# Patient Record
Sex: FEMALE | Race: BLACK OR AFRICAN AMERICAN | Employment: OTHER | RURAL
[De-identification: names, ages, dates, MRNs, and addresses within clinical notes are randomized per-mention and may not be internally consistent; named-entity substitution may affect disease eponyms.]

---

## 2017-01-11 DIAGNOSIS — F32.A DEPRESSION, UNSPECIFIED DEPRESSION TYPE: ICD-10-CM

## 2017-01-11 RX ORDER — PAROXETINE HYDROCHLORIDE 20 MG/1
20 TABLET, FILM COATED ORAL DAILY
Qty: 30 TAB | Refills: 5 | Status: SHIPPED | OUTPATIENT
Start: 2017-01-11 | End: 2017-01-27 | Stop reason: SDUPTHER

## 2017-01-27 ENCOUNTER — OFFICE VISIT (OUTPATIENT)
Dept: FAMILY MEDICINE CLINIC | Age: 57
End: 2017-01-27

## 2017-01-27 VITALS
HEART RATE: 78 BPM | BODY MASS INDEX: 38.33 KG/M2 | RESPIRATION RATE: 18 BRPM | DIASTOLIC BLOOD PRESSURE: 51 MMHG | SYSTOLIC BLOOD PRESSURE: 115 MMHG | WEIGHT: 203 LBS | TEMPERATURE: 97.8 F | OXYGEN SATURATION: 98 % | HEIGHT: 61 IN

## 2017-01-27 DIAGNOSIS — Z83.3 FAMILY HISTORY OF DIABETES MELLITUS: ICD-10-CM

## 2017-01-27 DIAGNOSIS — Z12.11 COLON CANCER SCREENING: ICD-10-CM

## 2017-01-27 DIAGNOSIS — F32.A DEPRESSION, UNSPECIFIED DEPRESSION TYPE: ICD-10-CM

## 2017-01-27 DIAGNOSIS — B99.9 GRANULOMA DUE TO INFECTION: ICD-10-CM

## 2017-01-27 DIAGNOSIS — Z12.39 BREAST CANCER SCREENING: ICD-10-CM

## 2017-01-27 DIAGNOSIS — Z82.49 FAMILY HISTORY OF CORONARY ARTERY DISEASE: ICD-10-CM

## 2017-01-27 DIAGNOSIS — F41.1 GAD (GENERALIZED ANXIETY DISORDER): Primary | ICD-10-CM

## 2017-01-27 DIAGNOSIS — F43.10 PTSD (POST-TRAUMATIC STRESS DISORDER): ICD-10-CM

## 2017-01-27 DIAGNOSIS — B00.9 HERPES SIMPLEX INFECTION: ICD-10-CM

## 2017-01-27 RX ORDER — OMEPRAZOLE 40 MG/1
40 CAPSULE, DELAYED RELEASE ORAL DAILY
Qty: 30 CAP | Refills: 5 | Status: SHIPPED | OUTPATIENT
Start: 2017-01-27 | End: 2018-03-12 | Stop reason: SDUPTHER

## 2017-01-27 RX ORDER — PRAZOSIN HYDROCHLORIDE 1 MG/1
1 CAPSULE ORAL
Qty: 30 CAP | Refills: 5 | Status: SHIPPED | OUTPATIENT
Start: 2017-01-27 | End: 2018-03-12 | Stop reason: SDUPTHER

## 2017-01-27 RX ORDER — ACYCLOVIR 800 MG/1
TABLET ORAL
Qty: 15 TAB | Refills: 12 | Status: SHIPPED | OUTPATIENT
Start: 2017-01-27 | End: 2018-05-18 | Stop reason: ALTCHOICE

## 2017-01-27 RX ORDER — PAROXETINE HYDROCHLORIDE 20 MG/1
20 TABLET, FILM COATED ORAL DAILY
Qty: 30 TAB | Refills: 5 | Status: SHIPPED | OUTPATIENT
Start: 2017-01-27 | End: 2018-03-12 | Stop reason: SDUPTHER

## 2017-01-27 RX ORDER — DOXYCYCLINE 100 MG/1
100 TABLET ORAL 2 TIMES DAILY
Qty: 60 TAB | Refills: 5 | Status: SHIPPED | OUTPATIENT
Start: 2017-01-27 | End: 2018-05-18 | Stop reason: ALTCHOICE
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
## 2017-01-27 NOTE — MR AVS SNAPSHOT
Visit Information Date & Time Provider Department Dept. Phone Encounter #  
 1/27/2017  1:00 PM Carlos Cortes MD [facility] [phone] [id number] Upcoming Health Maintenance Date Due Hepatitis C Screening 1960 DTaP/Tdap/Td series (1 - Tdap) 9/7/1981 PAP AKA CERVICAL CYTOLOGY 9/7/1981 BREAST CANCER SCRN MAMMOGRAM 9/7/2010 FOBT Q 1 YEAR AGE 50-75 9/7/2010 INFLUENZA AGE 9 TO ADULT 8/1/2016 Allergies as of 1/27/2017  Review Complete On: 1/27/2017 By: Carlos Cortes MD  
  
 Severity Noted Reaction Type Reactions Prozac [Fluoxetine]  08/22/2013    Unknown (comments) Current Immunizations  Never Reviewed No immunizations on file. Not reviewed this visit You Were Diagnosed With   
  
 Codes Comments FELIPE (generalized anxiety disorder)    -  Primary ICD-10-CM: F41.1 ICD-9-CM: 300.02 Herpes simplex infection     ICD-10-CM: B00.9 ICD-9-CM: 054.9 PTSD (post-traumatic stress disorder)     ICD-10-CM: F43.10 ICD-9-CM: 309.81 Family history of coronary artery disease     ICD-10-CM: Z82.49 
ICD-9-CM: V17.3 Family history of diabetes mellitus     ICD-10-CM: Z83.3 ICD-9-CM: V18.0 Granuloma due to infection     ICD-10-CM: L98.0 ICD-9-CM: 686.1 Breast cancer screening     ICD-10-CM: Z12.39 
ICD-9-CM: V76.10 Colon cancer screening     ICD-10-CM: Z12.11 ICD-9-CM: V76.51 Vitals BP Pulse Temp Resp Height(growth percentile) Weight(growth percentile) 115/51 (BP 1 Location: Left arm, BP Patient Position: Sitting) 78 97.8 °F (36.6 °C) (Oral) 18 5' 1\" (1.549 m) 203 lb (92.1 kg) LMP SpO2 BMI OB Status Smoking Status 12/27/2016 (Within Days) 98% 38.36 kg/m2 Perimenopausal Never Smoker Vitals History BMI and BSA Data Body Mass Index Body Surface Area  
 38.36 kg/m 2 1.99 m 2 Preferred Pharmacy Pharmacy Name Phone [facility], [place] [address] [phone] Your Updated Medication List  
  
   
This list is accurate as of: 1/27/17  1:57 PM.  Always use your most recent med list.  
  
  
  
  
 amitriptyline 25 mg tablet Commonly known as:  ELAVIL Take  by mouth nightly. azithromycin 250 mg tablet Commonly known as:  Nyla Pickup Take 2 tablets today, then take 1 tablet daily  
  
 fluticasone 50 mcg/actuation nasal spray Commonly known as:  FLONASE  
1 spray in each nostril twice a day PARoxetine 20 mg tablet Commonly known as:  PAXIL Take 1 Tab by mouth daily. Up to 3 a week for FELIPE  
  
 predniSONE 20 mg tablet Commonly known as:  DELTASONE  
5 po every day x 4 days then 4 on day 5, 3 on day 6, 2 on day 7, and 1 on day 8 PRILOSEC PO Take  by mouth.  
  
 pseudoephedrine  mg CR tablet Commonly known as:  SUDAFED 12 HOUR Take 1 Tab by mouth two (2) times daily as needed for Congestion. Introducing [facility]! Angelic Ribeiro introduces [email or web address] patient portal. Now you can access parts of your medical record, email your doctor's office, and request medication refills online. 1. In your internet browser, go to [email or web address] 2. Click on the First Time User? Click Here link in the Sign In box. You will see the New Member Sign Up page. 3. Enter your Neuroware[employer][email or web address] Access Code exactly as it appears below. You will not need to use this code after youve completed the sign-up process. If you do not sign up before the expiration date, you must request a new code. · [email or web address] Access Code: [email or web address] Expires: 3/15/2017  3:54 PM 
 
4. Enter the last four digits of your Social Security Number (xxxx) and Date of Birth (mm/dd/yyyy) as indicated and click Submit. You will be taken to the next sign-up page. 5. Create a [email or web address] ID.  This will be your Neuroware.io login ID and cannot be changed, so think of one that is secure and easy to remember. 6. Create a Diwanee password. You can change your password at any time. 7. Enter your Password Reset Question and Answer. This can be used at a later time if you forget your password. 8. Enter your e-mail address. You will receive e-mail notification when new information is available in [address] 9. Click Sign Up. You can now view and download portions of your medical record. 10. Click the Download Summary menu link to download a portable copy of your medical information. If you have questions, please visit the Frequently Asked Questions section of the Diwanee website. Remember, Diwanee is NOT to be used for urgent needs. For medical emergencies, dial 911. Now available from your iPhone and Android! Please provide this summary of care documentation to your next provider. Your primary care clinician is listed as Selene Garcia. If you have any questions after today's visit, please call [phone].

## 2017-01-29 LAB
CHOLEST SERPL-MCNC: 207 MG/DL (ref 100–199)
EST. AVERAGE GLUCOSE BLD GHB EST-MCNC: 134 MG/DL
HBA1C MFR BLD: 6.3 % (ref 4.8–5.6)
HDLC SERPL-MCNC: 73 MG/DL
HSV1 IGG SER IA-ACNC: >62.2 INDEX (ref 0–0.9)
HSV2 IGG SER IA-ACNC: 16.5 INDEX (ref 0–0.9)
INTERPRETATION, 910389: NORMAL
LDLC SERPL CALC-MCNC: 118 MG/DL (ref 0–99)
TRIGL SERPL-MCNC: 78 MG/DL (ref 0–149)
VLDLC SERPL CALC-MCNC: 16 MG/DL (ref 5–40)

## 2017-03-21 ENCOUNTER — OFFICE VISIT (OUTPATIENT)
Dept: FAMILY MEDICINE CLINIC | Age: 57
End: 2017-03-21

## 2017-03-21 VITALS
DIASTOLIC BLOOD PRESSURE: 53 MMHG | OXYGEN SATURATION: 100 % | HEIGHT: 61 IN | TEMPERATURE: 97.5 F | WEIGHT: 199 LBS | BODY MASS INDEX: 37.57 KG/M2 | RESPIRATION RATE: 18 BRPM | HEART RATE: 83 BPM | SYSTOLIC BLOOD PRESSURE: 126 MMHG

## 2017-03-21 DIAGNOSIS — J01.00 ACUTE NON-RECURRENT MAXILLARY SINUSITIS: ICD-10-CM

## 2017-03-21 DIAGNOSIS — M65.312 TRIGGER FINGER OF LEFT THUMB: Primary | ICD-10-CM

## 2017-03-21 DIAGNOSIS — R05.9 COUGH: ICD-10-CM

## 2017-03-21 RX ORDER — IPRATROPIUM BROMIDE 42 UG/1
2 SPRAY, METERED NASAL 4 TIMES DAILY
Qty: 15 ML | Refills: 0 | Status: SHIPPED | OUTPATIENT
Start: 2017-03-21 | End: 2018-05-18 | Stop reason: ALTCHOICE

## 2017-03-21 RX ORDER — PREDNISONE 20 MG/1
TABLET ORAL
Qty: 30 TAB | Refills: 0 | Status: SHIPPED | OUTPATIENT
Start: 2017-03-21 | End: 2018-05-18 | Stop reason: ALTCHOICE

## 2017-03-21 RX ORDER — CLINDAMYCIN HYDROCHLORIDE 150 MG/1
150 CAPSULE ORAL 3 TIMES DAILY
Qty: 30 CAP | Refills: 0 | Status: SHIPPED | OUTPATIENT
Start: 2017-03-21 | End: 2017-03-31

## 2017-03-21 NOTE — PROGRESS NOTES
Ann-Marie Michaels is a 64 y.o. female who presents with the following:  Chief Complaint   Patient presents with    Sinus Infection    Finger Pain     thumb, left       Sinus Infection    The history is provided by the patient (Patient with 2 months of sinus congestion and postnasal drip with chronic cough). Associated symptoms include congestion, sinus pressure, cough and rhinorrhea. Pertinent negatives include no chills, no sweats, no ear pain, no sore throat, no swollen glands, no shortness of breath, no neck pain, no neck pain, no headaches and no chest pain. Finger Pain   The history is provided by the patient (Patient with painful left trigger thumb and has pain at the DIP joint on the palmar side as well as the MPJ on the palmar side which is the most tender). Pertinent negatives include no chest pain, no abdominal pain, no headaches and no shortness of breath. Allergies   Allergen Reactions    Prozac [Fluoxetine] Unknown (comments)       Current Outpatient Prescriptions   Medication Sig    predniSONE (DELTASONE) 20 mg tablet 5 po every day x 4 days then 4 on day 5, 3 on day 6, 2 on day 7, and 1 on day 8    clindamycin (CLEOCIN) 150 mg capsule Take 1 Cap by mouth three (3) times daily for 10 days.  ipratropium (ATROVENT) 0.06 % nasal spray 2 Sprays by Both Nostrils route four (4) times daily.  prazosin (MINIPRESS) 1 mg capsule Take 1 Cap by mouth nightly.  doxycycline (ADOXA) 100 mg tablet Take 1 Tab by mouth two (2) times a day.  acyclovir (ZOVIRAX) 800 mg tablet 1 tablet p.o. 3 times a day for 5 days    PARoxetine (PAXIL) 20 mg tablet Take 1 Tab by mouth daily. Up to 3 a week for FELIPE    omeprazole (PRILOSEC) 40 mg capsule Take 1 Cap by mouth daily. No current facility-administered medications for this visit.         Past Medical History:   Diagnosis Date    Anxiety     Depression     Esophageal reflux     Insomnia     [facility] spotted fever) 03/2012       No past surgical history on file. Family History   Problem Relation Age of Onset    Diabetes Sister        Social History     Social History    Marital status:      Spouse name: N/A    Number of children: N/A    Years of education: N/A     Social History Main Topics    Smoking status: Never Smoker    Smokeless tobacco: Never Used    Alcohol use No    Drug use: No    Sexual activity: Not Asked     Other Topics Concern    None     Social History Narrative       Review of Systems   Constitutional: Negative for chills. HENT: Positive for congestion, rhinorrhea and sinus pressure. Negative for ear pain and sore throat. Respiratory: Positive for cough. Negative for shortness of breath. Cardiovascular: Negative for chest pain. Gastrointestinal: Negative for abdominal pain. Musculoskeletal: Negative for neck pain. Neurological: Negative for headaches. Visit Vitals    /53 (BP 1 Location: Left arm, BP Patient Position: Sitting)    Pulse 83    Temp 97.5 °F (36.4 °C) (Oral)    Resp 18    Ht 5' 1\" (1.549 m)    Wt 199 lb (90.3 kg)    SpO2 100%    BMI 37.6 kg/m2     Physical Exam   Constitutional: She is oriented to person, place, and time. No distress. Ill looking   HENT:   Head: Normocephalic and atraumatic. Right Ear: External ear normal.   Left Ear: External ear normal.   MM's are red with pus about them-post nasal drip with pus down the throat   Eyes: Conjunctivae and EOM are normal. Pupils are equal, round, and reactive to light. Right eye exhibits no discharge. Left eye exhibits no discharge. Neck: Normal range of motion. Neck supple. No tracheal deviation present. No thyromegaly present. Cardiovascular: Normal rate, regular rhythm, normal heart sounds and intact distal pulses. Exam reveals no gallop and no friction rub. No murmur heard. Pulmonary/Chest: Effort normal and breath sounds normal. No respiratory distress. She has no wheezes. She has no rales.  She exhibits no tenderness. Abdominal: Soft. Bowel sounds are normal. She exhibits no distension and no mass. There is no tenderness. Musculoskeletal: Normal range of motion. She exhibits tenderness (Tenderness at the MPJ on the palmar side of the thumb and at the DIP on the palmar side of the thumb demonstrating both a trigger effect). She exhibits no edema. Lymphadenopathy:     She has cervical adenopathy. Neurological: She is alert and oriented to person, place, and time. She has normal reflexes. No cranial nerve deficit. Gait normal. Coordination normal.   Skin: No rash noted. She is not diaphoretic. No erythema. Psychiatric: Mood, memory, affect and judgment normal.         ICD-10-CM ICD-9-CM    1. Trigger finger of left thumb M65.312 727.03 predniSONE (DELTASONE) 20 mg tablet   2. Acute non-recurrent maxillary sinusitis J01.00 461.0 predniSONE (DELTASONE) 20 mg tablet      clindamycin (CLEOCIN) 150 mg capsule   3. Cough R05 786.2 ipratropium (ATROVENT) 0.06 % nasal spray    the patient should return in 1 week if the trigger thumb has not resolved with ice applications 10 minutes 4 times a day and the prednisone she is also getting for her sinus infection then would inject these 2 areas    Orders Placed This Encounter    predniSONE (DELTASONE) 20 mg tablet     Si po every day x 4 days then 4 on day 5, 3 on day 6, 2 on day 7, and 1 on day 8     Dispense:  30 Tab     Refill:  0    clindamycin (CLEOCIN) 150 mg capsule     Sig: Take 1 Cap by mouth three (3) times daily for 10 days. Dispense:  30 Cap     Refill:  0    ipratropium (ATROVENT) 0.06 % nasal spray     Si Sprays by Both Nostrils route four (4) times daily.      Dispense:  15 mL     Refill:  0       Follow-up Disposition: Not on Wally Kaye MD

## 2017-03-21 NOTE — MR AVS SNAPSHOT
Visit Information Date & Time Provider Department Dept. Phone Encounter #  
 3/21/2017  1:20 PM Elvia Noel MD [facility] [phone] [id number] Your Appointments 7/27/2017  1:20 PM  
Follow Up with Elvia Noel MD  
[facility] [address]) Appt Note: 6 month f/u  
 [address] [facility] [phone]  
  
   
 [address] [facility] [phone] Upcoming Health Maintenance Date Due Hepatitis C Screening 1960 DTaP/Tdap/Td series (1 - Tdap) 9/7/1981 PAP AKA CERVICAL CYTOLOGY 9/7/1981 BREAST CANCER SCRN MAMMOGRAM 9/7/2010 FOBT Q 1 YEAR AGE 50-75 9/7/2010 INFLUENZA AGE 9 TO ADULT 8/1/2016 Allergies as of 3/21/2017  Review Complete On: 3/21/2017 By: Elvia Noel MD  
  
 Severity Noted Reaction Type Reactions Prozac [Fluoxetine]  08/22/2013    Unknown (comments) Current Immunizations  Never Reviewed No immunizations on file. Not reviewed this visit You Were Diagnosed With   
  
 Codes Comments Trigger finger of left thumb    -  Primary ICD-10-CM: R90.699 ICD-9-CM: 727.03 Acute non-recurrent maxillary sinusitis     ICD-10-CM: J01.00 ICD-9-CM: 461.0 Cough     ICD-10-CM: R05 ICD-9-CM: 441. 2 Vitals BP Pulse Temp Resp Height(growth percentile) Weight(growth percentile) 126/53 (BP 1 Location: Left arm, BP Patient Position: Sitting) 83 97.5 °F (36.4 °C) (Oral) 18 5' 1\" (1.549 m) 199 lb (90.3 kg) SpO2 BMI OB Status Smoking Status 100% 37.6 kg/m2 Perimenopausal Never Smoker Vitals History BMI and BSA Data Body Mass Index Body Surface Area  
 37.6 kg/m 2 1.97 m 2 Preferred Pharmacy Pharmacy Name Phone [facility] [place] [address], [place] - [address] [phone] Your Updated Medication List  
  
   
 This list is accurate as of: 3/21/17  2:03 PM.  Always use your most recent med list.  
  
  
  
  
 acyclovir 800 mg tablet Commonly known as:  ZOVIRAX 1 tablet p.o. 3 times a day for 5 days  
  
 clindamycin 150 mg capsule Commonly known as:  CLEOCIN Take 1 Cap by mouth three (3) times daily for 10 days. doxycycline 100 mg tablet Commonly known as:  ADOXA Take 1 Tab by mouth two (2) times a day. ipratropium 0.06 % nasal spray Commonly known as:  ATROVENT  
2 Sprays by Both Nostrils route four (4) times daily. omeprazole 40 mg capsule Commonly known as:  PriLOSEC Take 1 Cap by mouth daily. PARoxetine 20 mg tablet Commonly known as:  PAXIL Take 1 Tab by mouth daily. Up to 3 a week for FELIPE  
  
 prazosin 1 mg capsule Commonly known as:  MINIPRESS Take 1 Cap by mouth nightly. predniSONE 20 mg tablet Commonly known as:  DELTASONE  
5 po every day x 4 days then 4 on day 5, 3 on day 6, 2 on day 7, and 1 on day 8 Prescriptions Sent to Pharmacy Refills  
 predniSONE (DELTASONE) 20 mg tablet 0 Si po every day x 4 days then 4 on day 5, 3 on day 6, 2 on day 7, and 1 on day 8 Class: Normal  
 Pharmacy: [facility] [address] [place] [address] - [address] Ph #: [phone]  
 clindamycin (CLEOCIN) 150 mg capsule 0 Sig: Take 1 Cap by mouth three (3) times daily for 10 days. Class: Normal  
 Pharmacy: [facility] [address] [place] [address] [address] Ph #: [phone] Route: Oral  
 ipratropium (ATROVENT) 0.06 % nasal spray 0 Si Sprays by Both Nostrils route four (4) times daily. Class: Normal  
 Pharmacy: [facility] [address] [place] [address] [address] [address] [address] Ph #: [phone] Route: Both Nostrils Introducing [facility]!    
 [facility] introduces [employer] patient portal. Now you can access parts of your medical record, email your doctor's office, and request medication refills online. 1. In your internet browser, go to [email or web address] 2. Click on the First Time User? Click Here link in the Sign In box. You will see the New Member Sign Up page. 3. Enter your Agent Ace Access Code exactly as it appears below. You will not need to use this code after youve completed the sign-up process. If you do not sign up before the expiration date, you must request a new code. · [employer] Ace Access Code: [email or web address] Expires: 6/19/2017  2:00 PM 
 
4. Enter the last four digits of your Social Security Number (xxxx) and Date of Birth (mm/dd/yyyy) as indicated and click Submit. You will be taken to the next sign-up page. 5. Create a Agent Ace ID. This will be your Agent Ace login ID and cannot be changed, so think of one that is secure and easy to remember. 6. Create a Agent Ace password. You can change your password at any time. 7. Enter your Password Reset Question and Answer. This can be used at a later time if you forget your password. 8. Enter your e-mail address. You will receive e-mail notification when new information is available in [address] 9. Click Sign Up. You can now view and download portions of your medical record. 10. Click the Download Summary menu link to download a portable copy of your medical information. If you have questions, please visit the Frequently Asked Questions section of the Agent Ace website. Remember, Agent Ace is NOT to be used for urgent needs. For medical emergencies, dial 911. Now available from your iPhone and Android! Please provide this summary of care documentation to your next provider. Your primary care clinician is listed as Brooklyn Garcia. If you have any questions after today's visit, please call [phone].

## 2018-03-12 ENCOUNTER — OFFICE VISIT (OUTPATIENT)
Dept: FAMILY MEDICINE CLINIC | Age: 58
End: 2018-03-12

## 2018-03-12 VITALS
HEIGHT: 61 IN | RESPIRATION RATE: 18 BRPM | BODY MASS INDEX: 39.65 KG/M2 | HEART RATE: 91 BPM | SYSTOLIC BLOOD PRESSURE: 128 MMHG | TEMPERATURE: 97.9 F | OXYGEN SATURATION: 97 % | WEIGHT: 210 LBS | DIASTOLIC BLOOD PRESSURE: 82 MMHG

## 2018-03-12 DIAGNOSIS — F32.A DEPRESSION, UNSPECIFIED DEPRESSION TYPE: ICD-10-CM

## 2018-03-12 DIAGNOSIS — F43.10 PTSD (POST-TRAUMATIC STRESS DISORDER): ICD-10-CM

## 2018-03-12 DIAGNOSIS — Z13.220 LIPID SCREENING: ICD-10-CM

## 2018-03-12 DIAGNOSIS — F41.1 GAD (GENERALIZED ANXIETY DISORDER): Primary | ICD-10-CM

## 2018-03-12 DIAGNOSIS — Z12.39 BREAST CANCER SCREENING: ICD-10-CM

## 2018-03-12 DIAGNOSIS — Z11.59 ENCOUNTER FOR HEPATITIS C SCREENING TEST FOR LOW RISK PATIENT: ICD-10-CM

## 2018-03-12 DIAGNOSIS — K21.9 GASTROESOPHAGEAL REFLUX DISEASE WITHOUT ESOPHAGITIS: ICD-10-CM

## 2018-03-12 RX ORDER — PAROXETINE HYDROCHLORIDE 20 MG/1
20 TABLET, FILM COATED ORAL DAILY
Qty: 30 TAB | Refills: 5 | Status: SHIPPED | OUTPATIENT
Start: 2018-03-12 | End: 2019-04-24 | Stop reason: SDUPTHER

## 2018-03-12 RX ORDER — OMEPRAZOLE 40 MG/1
40 CAPSULE, DELAYED RELEASE ORAL DAILY
Qty: 30 CAP | Refills: 5 | Status: SHIPPED | OUTPATIENT
Start: 2018-03-12 | End: 2018-05-18

## 2018-03-12 RX ORDER — PRAZOSIN HYDROCHLORIDE 1 MG/1
CAPSULE ORAL
Qty: 150 CAP | Refills: 5 | Status: SHIPPED | OUTPATIENT
Start: 2018-03-12 | End: 2018-05-18

## 2018-03-12 NOTE — MR AVS SNAPSHOT
Daisyamanuel De La Vega 
 
 
 [address] [facility] [phone] Patient: Jonnie Collet MRN: [id number]:[id number] Visit Information Date & Time Provider Department Dept. Phone Encounter #  
 3/12/2018  9:00 AM Gillian Serrano  [address] [facility] [phone] [id number] Upcoming Health Maintenance Date Due  
 PAP AKA CERVICAL CYTOLOGY 9/7/1981 BREAST CANCER SCRN MAMMOGRAM 9/7/2010 FOBT Q 1 YEAR AGE 50-75 9/7/2010 DTaP/Tdap/Td series (2 - Td) 3/12/2028 Allergies as of 3/12/2018  Review Complete On: 3/12/2018 By: Gillian Serrano MD  
  
 Severity Noted Reaction Type Reactions Prozac [Fluoxetine]  08/22/2013    Unknown (comments) Current Immunizations  Never Reviewed No immunizations on file. Not reviewed this visit You Were Diagnosed With   
  
 Codes Comments FELIPE (generalized anxiety disorder)    -  Primary ICD-10-CM: F41.1 ICD-9-CM: 300.02 Depression, unspecified depression type     ICD-10-CM: F32.9 ICD-9-CM: 422 PTSD (post-traumatic stress disorder)     ICD-10-CM: F43.10 ICD-9-CM: 309.81 Gastroesophageal reflux disease without esophagitis     ICD-10-CM: K21.9 ICD-9-CM: 530.81 Encounter for hepatitis C screening test for low risk patient     ICD-10-CM: Z11.59 
ICD-9-CM: V73.89 Lipid screening     ICD-10-CM: R84.433 ICD-9-CM: V77.91 Breast cancer screening     ICD-10-CM: Z12.31 
ICD-9-CM: V76.10 Vitals BP Pulse Temp Resp Height(growth percentile) Weight(growth percentile) 128/82 (BP 1 Location: Left arm, BP Patient Position: Sitting) 91 97.9 °F (36.6 °C) (Oral) 18 5' 1\" (1.549 m) 210 lb (95.3 kg) SpO2 BMI OB Status Smoking Status 97% 39.68 kg/m2 Perimenopausal Never Smoker Vitals History BMI and BSA Data Body Mass Index Body Surface Area  
 39.68 kg/m 2 2.03 m 2 Preferred Pharmacy Pharmacy Name Phone [facility] [phone] [phone] Your Updated Medication List  
  
   
This list is accurate as of 3/12/18  9:38 AM.  Always use your most recent med list.  
  
  
  
  
 acyclovir 800 mg tablet Commonly known as:  ZOVIRAX 1 tablet p.o. 3 times a day for 5 days  
  
 doxycycline 100 mg tablet Commonly known as:  ADOXA Take 1 Tab by mouth two (2) times a day. ipratropium 42 mcg (0.06 %) nasal spray Commonly known as:  ATROVENT  
2 Sprays by Both Nostrils route four (4) times daily. omeprazole 40 mg capsule Commonly known as:  PriLOSEC Take 1 Cap by mouth daily. PARoxetine 20 mg tablet Commonly known as:  PAXIL Take 1 Tab by mouth daily. Up to 3 a week for FELIPE  
  
 prazosin 1 mg capsule Commonly known as:  MINIPRESS Take 1 Cap by mouth nightly. predniSONE 20 mg tablet Commonly known as:  DELTASONE  
5 po every day x 4 days then 4 on day 5, 3 on day 6, 2 on day 7, and 1 on day 8 Introducing [facility] SERVICES! Clark Callahan introduces [employer] patient portal. Now you can access parts of your medical record, email your doctor's office, and request medication refills online. 1. In your internet browser, go to [email or web address] 2. Click on the First Time User? Click Here link in the Sign In box. You will see the New Member Sign Up page. 3. Enter your [employer] Access Code exactly as it appears below. You will not need to use this code after youve completed the sign-up process. If you do not sign up before the expiration date, you must request a new code. · [employer] Access Code: [email or web address] Expires: 6/10/2018  8:57 AM 
 
4. Enter the last four digits of your Social Security Number (xxxx) and Date of Birth (mm/dd/yyyy) as indicated and click Submit. You will be taken to the next sign-up page. 5. Create a [employer] ID. This will be your [employer] login ID and cannot be changed, so think of one that is secure and easy to remember. 6. Create a [employer] password. You can change your password at any time. 7. Enter your Password Reset Question and Answer. This can be used at a later time if you forget your password. 8. Enter your e-mail address. You will receive e-mail notification when new information is available in [address] 9. Click Sign Up. You can now view and download portions of your medical record. 10. Click the Download Summary menu link to download a portable copy of your medical information. If you have questions, please visit the Frequently Asked Questions section of the [employer] website. Remember, [employer] is NOT to be used for urgent needs. For medical emergencies, dial 911. Now available from your iPhone and Android! Please provide this summary of care documentation to your next provider. Your primary care clinician is listed as Hakeem Berry. If you have any questions after today's visit, please call [phone].
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
## 2018-03-13 LAB
ALBUMIN SERPL-MCNC: 4.3 G/DL (ref 3.5–5.5)
ALBUMIN/GLOB SERPL: 1.4 {RATIO} (ref 1.2–2.2)
ALP SERPL-CCNC: 126 IU/L (ref 39–117)
ALT SERPL-CCNC: 15 IU/L (ref 0–32)
AST SERPL-CCNC: 17 IU/L (ref 0–40)
BILIRUB SERPL-MCNC: 0.3 MG/DL (ref 0–1.2)
BUN SERPL-MCNC: 10 MG/DL (ref 6–24)
BUN/CREAT SERPL: 14 (ref 9–23)
CALCIUM SERPL-MCNC: 9.4 MG/DL (ref 8.7–10.2)
CHLORIDE SERPL-SCNC: 99 MMOL/L (ref 96–106)
CHOLEST SERPL-MCNC: 224 MG/DL (ref 100–199)
CO2 SERPL-SCNC: 27 MMOL/L (ref 18–29)
CREAT SERPL-MCNC: 0.73 MG/DL (ref 0.57–1)
GFR SERPLBLD CREATININE-BSD FMLA CKD-EPI: 106 ML/MIN/1.73
GFR SERPLBLD CREATININE-BSD FMLA CKD-EPI: 92 ML/MIN/1.73
GLOBULIN SER CALC-MCNC: 3.1 G/DL (ref 1.5–4.5)
GLUCOSE SERPL-MCNC: 113 MG/DL (ref 65–99)
HCV AB S/CO SERPL IA: <0.1 S/CO RATIO (ref 0–0.9)
HDLC SERPL-MCNC: 60 MG/DL
INTERPRETATION, 910389: NORMAL
LDLC SERPL CALC-MCNC: 140 MG/DL (ref 0–99)
POTASSIUM SERPL-SCNC: 4.5 MMOL/L (ref 3.5–5.2)
PROT SERPL-MCNC: 7.4 G/DL (ref 6–8.5)
SODIUM SERPL-SCNC: 141 MMOL/L (ref 134–144)
TRIGL SERPL-MCNC: 121 MG/DL (ref 0–149)
VLDLC SERPL CALC-MCNC: 24 MG/DL (ref 5–40)

## 2018-05-18 ENCOUNTER — OFFICE VISIT (OUTPATIENT)
Dept: FAMILY MEDICINE CLINIC | Age: 58
End: 2018-05-18

## 2018-05-18 VITALS
DIASTOLIC BLOOD PRESSURE: 54 MMHG | TEMPERATURE: 97.5 F | BODY MASS INDEX: 39.65 KG/M2 | HEIGHT: 61 IN | WEIGHT: 210 LBS | RESPIRATION RATE: 18 BRPM | OXYGEN SATURATION: 98 % | HEART RATE: 94 BPM | SYSTOLIC BLOOD PRESSURE: 136 MMHG

## 2018-05-18 DIAGNOSIS — R09.82 POST-NASAL DRIP: Primary | ICD-10-CM

## 2018-05-18 DIAGNOSIS — H61.23 BILATERAL IMPACTED CERUMEN: ICD-10-CM

## 2018-05-18 DIAGNOSIS — J30.2 SEASONAL ALLERGIC RHINITIS, UNSPECIFIED TRIGGER: ICD-10-CM

## 2018-05-18 RX ORDER — FLUTICASONE PROPIONATE 50 MCG
2 SPRAY, SUSPENSION (ML) NASAL DAILY
Qty: 1 BOTTLE | Refills: 0 | Status: SHIPPED | OUTPATIENT
Start: 2018-05-18 | End: 2019-06-18

## 2018-05-18 RX ORDER — CETIRIZINE HCL 10 MG
10 TABLET ORAL DAILY
Qty: 30 TAB | Refills: 3 | Status: SHIPPED | OUTPATIENT
Start: 2018-05-18 | End: 2018-08-16 | Stop reason: ALTCHOICE

## 2018-05-18 NOTE — PROGRESS NOTES
Subjective:     Chief Complaint   Patient presents with    Sinus Infection       Ashley Velázquez is a 62 y.o. female who presents today with c/o a persistent cough for over 4 weeks. Cough produces mucus that is usually clear, sometimes yellow and is worse at night. Associated with a runny nose. Has been taking OTC sinus medication without relief. No hx of asthma or other respiratory conditions. Patient Active Problem List   Diagnosis Code    FELIPE (generalized anxiety disorder) F41.1    Trigger finger of left thumb M65.312       Past Medical History:   Diagnosis Date    Anxiety     Depression     Esophageal reflux     Insomnia     [facility]         Current Outpatient Prescriptions:     PARoxetine (PAXIL) 20 mg tablet, Take 1 Tab by mouth daily. Up to 3 a week for FELIPE, Disp: 30 Tab, Rfl: 5    omeprazole (PRILOSEC) 40 mg capsule, Take 1 Cap by mouth daily. , Disp: 30 Cap, Rfl: 5    prazosin (MINIPRESS) 1 mg capsule, Take 1 to 5 caps nightly, Disp: 150 Cap, Rfl: 5    predniSONE (DELTASONE) 20 mg tablet, 5 po every day x 4 days then 4 on day 5, 3 on day 6, 2 on day 7, and 1 on day 8, Disp: 30 Tab, Rfl: 0    ipratropium (ATROVENT) 0.06 % nasal spray, 2 Sprays by Both Nostrils route four (4) times daily. , Disp: 15 mL, Rfl: 0    doxycycline (ADOXA) 100 mg tablet, Take 1 Tab by mouth two (2) times a day., Disp: 60 Tab, Rfl: 5    acyclovir (ZOVIRAX) 800 mg tablet, 1 tablet p.o. 3 times a day for 5 days, Disp: 15 Tab, Rfl: 12    Allergies   Allergen Reactions    Prozac [Fluoxetine] Unknown (comments)       No past surgical history on file.     History   Smoking Status    Never Smoker   Smokeless Tobacco    Never Used       Social History     Social History    Marital status:      Spouse name: N/A    Number of children: N/A    Years of education: N/A     Social History Main Topics    Smoking status: Never Smoker    Smokeless tobacco: Never Used    Alcohol use No  Drug use: No    Sexual activity: Not on file     Other Topics Concern    Not on file     Social History Narrative       Family History   Problem Relation Age of Onset    Diabetes Sister        ROS:  Gen: denies fever, chills, or fatigue  HEENT:+ rhinorrhea. denies H/A, sinus tenderness, nasal congestion, ear pain, or sore throat  Resp: + cough. denies dyspnea or wheezing  CV: denies chest pain, pressure, or palpitations  Extremeties: denies edema, pallor, or cyanosis  Musculoskeletal: no myalgia  Skin: denies rashes or new lesions   Heme: no lymphadenopathy     Objective:     Visit Vitals    Resp 18    Ht 5' 1\" (1.549 m)    Wt 210 lb (95.3 kg)    BMI 39.68 kg/m2     Body mass index is 39.68 kg/(m^2). General: Alert and oriented. No acute distress. Well nourished. HEENT : No sinus tenderness  Ears: Both ears full of cerumen, unable to see TMs. Ears irrigated with good results, TMs pearly gray bilaterally. Eyes: Sclera white, conjunctiva clear. PERRLA. Extra ocular movements intact. Nose: Patent. Nasal mucosa pink, non-edematous, and without drainage. Mouth: Pharynx erythematous, large +3 tonsils, tonsil stone present on R tonsil  Neck: Supple with FROM. No lymphadenopathy  Lungs: Breathing even and unlabored. All lobes clear to auscultation bilaterally   Heart :RRR, S1 and S2 normal intensity, no extra heart sounds  Extremities: Non-edematous, no pallor or cyanosis. Bilat. radial pulses 2+  Skin: Warm, dry, and intact. No lesions or discoloration. No results found for this visit on 05/18/18. Assessment/ Plan:     1. Post nasal drip r/t allergic rhinnitis  D/C OTC sinus medication  Start Zyrtec 10mg po daily   Start Flonase 50mcg/act- 2 sprays in each nostril once daily  RTO in 1-2 weeks if symptoms do not improve. RTO sooner if you develop fever, chills, CP, wheezing, or sore throat. F/U prn    2. Bilateral cerumen impaction  Ears irrigated with good results.   Recommended OTC De-brox drops for future use      Verbal and written instructions (see AVS) provided.  Patient expresses understanding of diagnosis and treatment plan. Health Maintenance Due   Topic Date Due    PAP AKA CERVICAL CYTOLOGY  09/07/1981    FOBT Q 1 YEAR AGE 50-75  09/07/2010         Follow-up Disposition: Not on File      Ana Reinoso NP

## 2018-05-18 NOTE — MR AVS SNAPSHOT
[address] 67 [phone] Patient: Peterson Galo MRN: [id number] Visit Information Date & Time Provider Department Dept. Phone Encounter #  
 5/18/2018  1:00 PM Claire Hope NP [address] [facility] [address] [phone] [id number] Follow-up Instructions Return if symptoms worsen or fail to improve. Your Appointments 6/12/2018  9:00 AM  
Follow Up with Bharat Swan MD  
[facility] ([facility]) Appt Note: 3 month f/u  
 [address]  
[phone] [address] Upcoming Health Maintenance Date Due  
 PAP AKA CERVICAL CYTOLOGY 9/7/1981 FOBT Q 1 YEAR AGE 50-75 9/7/2010 Influenza Age 5 to Adult 8/1/2018 BREAST CANCER SCRN MAMMOGRAM 3/22/2020 DTaP/Tdap/Td series (2 - Td) 3/12/2028 Allergies as of 5/18/2018  Review Complete On: 5/18/2018 By: Claire Hope NP Severity Noted Reaction Type Reactions Prozac [Fluoxetine]  08/22/2013    Unknown (comments) Current Immunizations  Never Reviewed No immunizations on file. Not reviewed this visit You Were Diagnosed With   
  
 Codes Comments Bilateral impacted cerumen    -  Primary ICD-10-CM: J04.56 
ICD-9-CM: 380.4 Post-nasal drip     ICD-10-CM: R09.82 ICD-9-CM: 784.91 Seasonal allergic rhinitis, unspecified trigger     ICD-10-CM: J30.2 ICD-9-CM: 477.9 Vitals BP Pulse Temp Resp Height(growth percentile) Weight(growth percentile) 136/54 94 97.5 °F (36.4 °C) 18 5' 1\" (1.549 m) 210 lb (95.3 kg) SpO2 BMI OB Status Smoking Status 98% 39.68 kg/m2 Perimenopausal Never Smoker Vitals History BMI and BSA Data Body Mass Index Body Surface Area  
 39.68 kg/m 2 2.03 m 2 Preferred Pharmacy Pharmacy Name Phone [facility] [address] Suresh Tucker [phone] Your Updated Medication List  
  
   
This list is accurate as of 5/18/18  1:52 PM.  Always use your most recent med list.  
  
  
  
  
 PARoxetine 20 mg tablet Commonly known as:  PAXIL Take 1 Tab by mouth daily. Up to 3 a week for FELIPE We Performed the Following REMOVAL IMPACTED CERUMEN IRRIGATION/LVG UNILAT [id number] CPT(R)] Follow-up Instructions Return if symptoms worsen or fail to improve. Patient Instructions Earwax Blockage: Care Instructions Your Care Instructions Earwax is a natural substance that protects the ear canal. Normally, earwax drains from the ears and does not cause problems. Sometimes earwax builds up and hardens. Earwax blockage (also called cerumen impaction) can cause some loss of hearing and pain. When wax is tightly packed, you will need to have your doctor remove it. Follow-up care is a key part of your treatment and safety. Be sure to make and go to all appointments, and call your doctor if you are having problems. It's also a good idea to know your test results and keep a list of the medicines you take. How can you care for yourself at home? · Do not try to remove earwax with cotton swabs, fingers, or other objects. This can make the blockage worse and damage the eardrum. · If your doctor recommends that you try to remove earwax at home: ¨ Soften and loosen the earwax with warm mineral oil. You also can try hydrogen peroxide mixed with an equal amount of room temperature water. Place 2 drops of the fluid, warmed to body temperature, in the ear two times a day for up to 5 days. ¨ Once the wax is loose and soft, all that is usually needed to remove it from the ear canal is a gentle, warm shower. Direct the water into the ear, then tip your head to let the earwax drain out.  Dry your ear thoroughly with a hair dryer set on low. Hold the dryer several inches from your ear. ¨ If the warm mineral oil and shower do not work, use an over-the-counter wax softener followed by gentle flushing with an ear syringe each night for a week or two. Make sure the flushing solution is body temperature. Cool or hot fluids in the ear can cause dizziness. When should you call for help? Call your doctor now or seek immediate medical care if: 
? · Pus or blood drains from your ear. ? · Your ears are ringing or feel full. ? · You have a loss of hearing. ? Watch closely for changes in your health, and be sure to contact your doctor if: 
? · You have pain or reduced hearing after 1 week of home treatment. ? · You have any new symptoms, such as nausea or balance problems. Where can you learn more? Go to http://no-rodo.info/. Enter O807 in the search box to learn more about \"Earwax Blockage: Care Instructions. \" Current as of: March 20, 2017 Content Version: 11.4 © 1568-5954 [employer]. Care instructions adapted under license by [employer] (which disclaims liability or warranty for this information). If you have questions about a medical condition or this instruction, always ask your healthcare professional. [place] [address] any warranty or liability for your use of this information. Allergies: Care Instructions Your Care Instructions Allergies occur when your body's defense system (immune system) overreacts to certain substances. The immune system treats a harmless substance as if it were a harmful germ or virus. Many things can cause this overreaction, including pollens, medicine, food, dust, animal dander, and mold. Allergies can be mild or severe. Mild allergies can be managed with home treatment. But medicine may be needed to prevent problems. Managing your allergies is an important part of staying healthy.  Your doctor may suggest that you have allergy testing to help find out what is causing your allergies. When you know what things trigger your symptoms, you can avoid them. This can prevent allergy symptoms and other health problems. For severe allergies that cause reactions that affect your whole body (anaphylactic reactions), your doctor may prescribe a shot of epinephrine to carry with you in case you have a severe reaction. Learn how to give yourself the shot and keep it with you at all times. Make sure it is not . Follow-up care is a key part of your treatment and safety. Be sure to make and go to all appointments, and call your doctor if you are having problems. It's also a good idea to know your test results and keep a list of the medicines you take. How can you care for yourself at home? · If you have been told by your doctor that dust or dust mites are causing your allergy, decrease the dust around your bed: 
[facility] sheets, pillowcases, and other bedding in hot water every week. ¨ Use dust-proof covers for pillows, duvets, and mattresses. Avoid plastic covers because they tear easily and do not \"breathe. \" Wash as instructed on the label. ¨ Do not use any blankets and pillows that you do not need. ¨ Use blankets that you can wash in your washing machine. ¨ Consider removing drapes and carpets, which attract and hold dust, from your bedroom. · If you are allergic to house dust and mites, do not use home humidifiers. Your doctor can suggest ways you can control dust and mites. · Look for signs of cockroaches. Cockroaches cause allergic reactions. Use cockroach baits to get rid of them. Then, clean your home well. Cockroaches like areas where grocery bags, newspapers, empty bottles, or cardboard boxes are stored. Do not keep these inside your home, and keep trash and food containers sealed. Seal off any spots where cockroaches might enter your home. · If you are allergic to mold, get rid of furniture, rugs, and drapes that smell musty. Check for mold in the bathroom. · If you are allergic to outdoor pollen or mold spores, use air-conditioning. Change or clean all filters every month. Keep windows closed. · If you are allergic to pollen, stay inside when pollen counts are high. Use a vacuum  with a HEPA filter or a double-thickness filter at least two times each week. · Stay inside when air pollution is bad. Avoid paint fumes, perfumes, and other strong odors. · Avoid conditions that make your allergies worse. Stay away from smoke. Do not smoke or let anyone else smoke in your house. Do not use fireplaces or wood-burning stoves. · If you are allergic to your pets, change the air filter in your furnace every month. Use high-efficiency filters. · If you are allergic to pet dander, keep pets outside or out of your bedroom. Old carpet and cloth furniture can hold a lot of animal dander. You may need to replace them. When should you call for help? Give an epinephrine shot if: 
? · You think you are having a severe allergic reaction. ? · You have symptoms in more than one body area, such as mild nausea and an itchy mouth. ? After giving an epinephrine shot call 911, even if you feel better. ?Call 911 if: 
? · You have symptoms of a severe allergic reaction. These may include: 
¨ Sudden raised, red areas (hives) all over your body. ¨ Swelling of the throat, mouth, lips, or tongue. ¨ Trouble breathing. ¨ Passing out (losing consciousness). Or you may feel very lightheaded or suddenly feel weak, confused, or restless. ? · You have been given an epinephrine shot, even if you feel better. ?Call your doctor now or seek immediate medical care if: 
? · You have symptoms of an allergic reaction, such as: ¨ A rash or hives (raised, red areas on the skin). ¨ Itching. ¨ Swelling. ¨ Belly pain, nausea, or vomiting. ?Watch closely for changes in your health, and be sure to contact your doctor if: 
? · You do not get better as expected. Where can you learn more? Go to http://no-rodo.info/. Enter N843 in the search box to learn more about \"Allergies: Care Instructions. \" Current as of: September 29, 2016 Content Version: 11.4 © 5626-1089 [employer]. Care instructions adapted under license by [employer] (which disclaims liability or warranty for this information). If you have questions about a medical condition or this instruction, always ask your healthcare professional. [place] [address] any warranty or liability for your use of this information. Introducing [facility]! [facility] introduces [email or web address] patient portal. Now you can access parts of your medical record, email your doctor's office, and request medication refills online. 1. In your internet browser, go to [email or web address] 2. Click on the First Time User? Click Here link in the Sign In box. You will see the New Member Sign Up page. 3. Enter your [email or web address] Access Code exactly as it appears below. You will not need to use this code after youve completed the sign-up process. If you do not sign up before the expiration date, you must request a new code. · [email or web address] Access Code: [email or web address] Expires: 6/10/2018  8:57 AM 
 
4. Enter the last four digits of your Social Security Number (xxxx) and Date of Birth (mm/dd/yyyy) as indicated and click Submit. You will be taken to the next sign-up page. 5. Create a Amplidatat ID. This will be your [email or web address] login ID and cannot be changed, so think of one that is secure and easy to remember. 6. Create a [email or web address].com password. You can change your password at any time. 7. Enter your Password Reset Question and Answer. This can be used at a later time if you forget your password. 8. Enter your e-mail address. You will receive e-mail notification when new information is available in [address] 9. Click Sign Up. You can now view and download portions of your medical record. 10. Click the Download Summary menu link to download a portable copy of your medical information. If you have questions, please visit the Frequently Asked Questions section of the [employer] website. Remember, [employer] is NOT to be used for urgent needs. For medical emergencies, dial 911. Now available from your iPhone and Android! Please provide this summary of care documentation to your next provider. Your primary care clinician is listed as Troy Rollins. If you have any questions after today's visit, please call [phone].

## 2018-05-18 NOTE — PATIENT INSTRUCTIONS
Earwax Blockage: Care Instructions  Your Care Instructions    Earwax is a natural substance that protects the ear canal. Normally, earwax drains from the ears and does not cause problems. Sometimes earwax builds up and hardens. Earwax blockage (also called cerumen impaction) can cause some loss of hearing and pain. When wax is tightly packed, you will need to have your doctor remove it. Follow-up care is a key part of your treatment and safety. Be sure to make and go to all appointments, and call your doctor if you are having problems. It's also a good idea to know your test results and keep a list of the medicines you take. How can you care for yourself at home? · Do not try to remove earwax with cotton swabs, fingers, or other objects. This can make the blockage worse and damage the eardrum. · If your doctor recommends that you try to remove earwax at home:  ¨ Soften and loosen the earwax with warm mineral oil. You also can try hydrogen peroxide mixed with an equal amount of room temperature water. Place 2 drops of the fluid, warmed to body temperature, in the ear two times a day for up to 5 days. ¨ Once the wax is loose and soft, all that is usually needed to remove it from the ear canal is a gentle, warm shower. Direct the water into the ear, then tip your head to let the earwax drain out. Dry your ear thoroughly with a hair dryer set on low. Hold the dryer several inches from your ear. ¨ If the warm mineral oil and shower do not work, use an over-the-counter wax softener followed by gentle flushing with an ear syringe each night for a week or two. Make sure the flushing solution is body temperature. Cool or hot fluids in the ear can cause dizziness. When should you call for help? Call your doctor now or seek immediate medical care if:  ? · Pus or blood drains from your ear. ? · Your ears are ringing or feel full. ? · You have a loss of hearing. ? Watch closely for changes in your health, and be sure to contact your doctor if:  ? · You have pain or reduced hearing after 1 week of home treatment. ? · You have any new symptoms, such as nausea or balance problems. Where can you learn more? Go to http://no-rodo.info/. Enter V854 in the search box to learn more about \"Earwax Blockage: Care Instructions. \"  Current as of: 2017  Content Version: 11.4  © 8127-1748 [employer]. Care instructions adapted under license by [employer] (which disclaims liability or warranty for this information). If you have questions about a medical condition or this instruction, always ask your healthcare professional. [place] [address] any warranty or liability for your use of this information. Allergies: Care Instructions  Your Care Instructions    Allergies occur when your body's defense system (immune system) overreacts to certain substances. The immune system treats a harmless substance as if it were a harmful germ or virus. Many things can cause this overreaction, including pollens, medicine, food, dust, animal dander, and mold. Allergies can be mild or severe. Mild allergies can be managed with home treatment. But medicine may be needed to prevent problems. Managing your allergies is an important part of staying healthy. Your doctor may suggest that you have allergy testing to help find out what is causing your allergies. When you know what things trigger your symptoms, you can avoid them. This can prevent allergy symptoms and other health problems. For severe allergies that cause reactions that affect your whole body (anaphylactic reactions), your doctor may prescribe a shot of epinephrine to carry with you in case you have a severe reaction. Learn how to give yourself the shot and keep it with you at all times. Make sure it is not . Follow-up care is a key part of your treatment and safety.  Be sure to make and go to all appointments, and call your doctor if you are having problems. It's also a good idea to know your test results and keep a list of the medicines you take. How can you care for yourself at home? · If you have been told by your doctor that dust or dust mites are causing your allergy, decrease the dust around your bed:  [facility] sheets, pillowcases, and other bedding in hot water every week. ¨ Use dust-proof covers for pillows, duvets, and mattresses. Avoid plastic covers because they tear easily and do not \"breathe. \" Wash as instructed on the label. ¨ Do not use any blankets and pillows that you do not need. ¨ Use blankets that you can wash in your washing machine. ¨ Consider removing drapes and carpets, which attract and hold dust, from your bedroom. · If you are allergic to house dust and mites, do not use home humidifiers. Your doctor can suggest ways you can control dust and mites. · Look for signs of cockroaches. Cockroaches cause allergic reactions. Use cockroach baits to get rid of them. Then, clean your home well. Cockroaches like areas where grocery bags, newspapers, empty bottles, or cardboard boxes are stored. Do not keep these inside your home, and keep trash and food containers sealed. Seal off any spots where cockroaches might enter your home. · If you are allergic to mold, get rid of furniture, rugs, and drapes that smell musty. Check for mold in the bathroom. · If you are allergic to outdoor pollen or mold spores, use air-conditioning. Change or clean all filters every month. Keep windows closed. · If you are allergic to pollen, stay inside when pollen counts are high. Use a vacuum  with a HEPA filter or a double-thickness filter at least two times each week. · Stay inside when air pollution is bad. Avoid paint fumes, perfumes, and other strong odors. · Avoid conditions that make your allergies worse. Stay away from smoke. Do not smoke or let anyone else smoke in your house.  Do not use fireplaces or wood-burning stoves. · If you are allergic to your pets, change the air filter in your furnace every month. Use high-efficiency filters. · If you are allergic to pet dander, keep pets outside or out of your bedroom. Old carpet and cloth furniture can hold a lot of animal dander. You may need to replace them. When should you call for help? Give an epinephrine shot if:  ? · You think you are having a severe allergic reaction. ? · You have symptoms in more than one body area, such as mild nausea and an itchy mouth. ? After giving an epinephrine shot call 911, even if you feel better. ?Call 911 if:  ? · You have symptoms of a severe allergic reaction. These may include:  ¨ Sudden raised, red areas (hives) all over your body. ¨ Swelling of the throat, mouth, lips, or tongue. ¨ Trouble breathing. ¨ Passing out (losing consciousness). Or you may feel very lightheaded or suddenly feel weak, confused, or restless. ? · You have been given an epinephrine shot, even if you feel better. ?Call your doctor now or seek immediate medical care if:  ? · You have symptoms of an allergic reaction, such as:  ¨ A rash or hives (raised, red areas on the skin). ¨ Itching. ¨ Swelling. ¨ Belly pain, nausea, or vomiting. ? Watch closely for changes in your health, and be sure to contact your doctor if:  ? · You do not get better as expected. Where can you learn more? Go to http://no-rodo.info/. Enter E441 in the search box to learn more about \"Allergies: Care Instructions. \"  Current as of: September 29, 2016  Content Version: 11.4  © 9171-9565 [employer]. Care instructions adapted under license by [employer] (which disclaims liability or warranty for this information).  If you have questions about a medical condition or this instruction, always ask your healthcare professional. [address] any warranty or liability for your use of this information.

## 2018-05-18 NOTE — PROGRESS NOTES
1. Have you been to the ER, urgent care clinic since your last visit? Hospitalized since your last visit?no    2. Have you seen or consulted any other health care providers outside of the [address] [place] [address] since your last visit? Include any pap smears or colon screening.  no

## 2018-06-22 ENCOUNTER — OFFICE VISIT (OUTPATIENT)
Dept: FAMILY MEDICINE CLINIC | Age: 58
End: 2018-06-22

## 2018-06-22 VITALS
BODY MASS INDEX: 39.8 KG/M2 | TEMPERATURE: 98.8 F | DIASTOLIC BLOOD PRESSURE: 78 MMHG | RESPIRATION RATE: 18 BRPM | HEIGHT: 61 IN | WEIGHT: 210.8 LBS | OXYGEN SATURATION: 99 % | HEART RATE: 85 BPM | SYSTOLIC BLOOD PRESSURE: 117 MMHG

## 2018-06-22 DIAGNOSIS — J31.0 NONALLERGIC RHINITIS: Primary | ICD-10-CM

## 2018-06-22 DIAGNOSIS — J30.9 CHRONIC ALLERGIC RHINITIS: ICD-10-CM

## 2018-06-22 PROBLEM — E66.01 SEVERE OBESITY (BMI 35.0-39.9): Status: ACTIVE | Noted: 2018-06-22

## 2018-06-22 RX ORDER — PREDNISONE 20 MG/1
TABLET ORAL
Qty: 30 TAB | Refills: 0 | Status: SHIPPED | OUTPATIENT
Start: 2018-06-22 | End: 2018-08-16 | Stop reason: ALTCHOICE

## 2018-06-22 RX ORDER — PSEUDOEPHEDRINE HCL 120 MG/1
120 TABLET, FILM COATED, EXTENDED RELEASE ORAL
Qty: 20 TAB | Refills: 2 | Status: SHIPPED | OUTPATIENT
Start: 2018-06-22 | End: 2018-08-16 | Stop reason: SDUPTHER

## 2018-06-22 RX ORDER — IPRATROPIUM BROMIDE 42 UG/1
1 SPRAY, METERED NASAL 4 TIMES DAILY
Qty: 15 ML | Refills: 3 | Status: SHIPPED | OUTPATIENT
Start: 2018-06-22 | End: 2018-08-16 | Stop reason: ALTCHOICE

## 2018-06-22 NOTE — PROGRESS NOTES
Ashley Velázquez is a 62 y.o. female who presents with the following:  Chief Complaint   Patient presents with    Sinus Infection    Nasal Congestion    Headache       Sinus Infection    The history is provided by the patient (Patient complains of headache congestion postnasal drip with cough but no fever and no pain in the sinuses. This is been going on for several months. ). Associated symptoms include congestion, cough, rhinorrhea and headaches. Pertinent negatives include no chills, no sweats, no ear pain, no hoarse voice, no sinus pressure, no sore throat, no swollen glands, no shortness of breath, no neck pain and no neck pain. Nasal Congestion    Associated symptoms include congestion, cough, rhinorrhea and headaches. Pertinent negatives include no chills, no sweats, no ear pain, no hoarse voice, no sinus pressure, no sore throat, no swollen glands, no shortness of breath, no neck pain and no neck pain. Headache   Associated symptoms include headaches. Pertinent negatives include no shortness of breath. Allergies   Allergen Reactions    Prozac [Fluoxetine] Unknown (comments)       Current Outpatient Prescriptions   Medication Sig    predniSONE (DELTASONE) 20 mg tablet 5 tablets day for days 1 through 4, then 4 tablets on day 5, then 3 tablets on day 6, then 2 tablets on day 7, then 1 tablet on day 8.  pseudoephedrine CR (SUDAFED 12 HOUR) 120 mg CR tablet Take 1 Tab by mouth two (2) times daily as needed for Congestion.  ipratropium (ATROVENT) 42 mcg (0.06 %) nasal spray 1 [place] by Both Nostrils route four (4) times daily. Indications: Rhinorrhea    PARoxetine (PAXIL) 20 mg tablet Take 1 Tab by mouth daily. Up to 3 a week for FELIPE    cetirizine (ZYRTEC) 10 mg tablet Take 1 Tab by mouth daily. Indications: Allergic Rhinitis    fluticasone (FLONASE) 50 mcg/actuation nasal spray 2 Sprays by Both Nostrils route daily. Indications:  Allergic Rhinitis     No current facility-administered medications for this visit. Past Medical History:   Diagnosis Date    Anxiety     Depression     Esophageal reflux     Insomnia     [facility] spotted fever) 03/2012       No past surgical history on file. Family History   Problem Relation Age of Onset    Diabetes Sister        Social History     Social History    Marital status:      Spouse name: N/A    Number of children: N/A    Years of education: N/A     Social History Main Topics    Smoking status: Never Smoker    Smokeless tobacco: Never Used    Alcohol use No    Drug use: No    Sexual activity: Not Asked     Other Topics Concern    None     Social History Narrative       Review of Systems   Constitutional: Negative for chills. HENT: Positive for congestion and rhinorrhea. Negative for ear pain, hoarse voice, sinus pressure and sore throat. Respiratory: Positive for cough. Negative for shortness of breath. Musculoskeletal: Negative for neck pain. Neurological: Positive for headaches. Visit Vitals    /78 (BP 1 Location: Left arm, BP Patient Position: Sitting)    Pulse 85    Temp 98.8 °F (37.1 °C) (Oral)    Resp 18    Ht 5' 1\" (1.549 m)    Wt 210 lb 12.8 oz (95.6 kg)    SpO2 99%    BMI 39.83 kg/m2     Physical Exam   Constitutional: She is oriented to person, place, and time. No distress. Has coryza that is clear - mild distress   HENT:   Head: Normocephalic and atraumatic. Right Ear: External ear normal.   Left Ear: External ear normal.   Mouth/Throat: No oropharyngeal exudate. Red mm's in the nose and tht   Eyes: Conjunctivae and EOM are normal. Pupils are equal, round, and reactive to light. Right eye exhibits no discharge. Left eye exhibits no discharge. Neck: Normal range of motion. Neck supple. No tracheal deviation present. No thyromegaly present. Cardiovascular: Normal rate, regular rhythm, normal heart sounds and intact distal pulses.   Exam reveals no gallop and no friction rub. No murmur heard. Pulmonary/Chest: Effort normal and breath sounds normal. No stridor. No respiratory distress. She has no wheezes. She has no rales. She exhibits no tenderness. Abdominal: She exhibits no distension and no mass. There is no tenderness. There is no guarding. Musculoskeletal: She exhibits no edema or tenderness. Lymphadenopathy:     She has no cervical adenopathy. Neurological: She is alert and oriented to person, place, and time. She has normal reflexes. Gait normal.   Skin: Skin is warm and dry. No rash noted. She is not diaphoretic. No erythema. Psychiatric: Mood, memory, affect and judgment normal.         ICD-10-CM ICD-9-CM    1. Nonallergic rhinitis J31.0 472.0 predniSONE (DELTASONE) 20 mg tablet      pseudoephedrine CR (SUDAFED 12 HOUR) 120 mg CR tablet      ipratropium (ATROVENT) 42 mcg (0.06 %) nasal spray   2. Chronic allergic rhinitis J30.9 477.9 predniSONE (DELTASONE) 20 mg tablet      pseudoephedrine CR (SUDAFED 12 HOUR) 120 mg CR tablet      ipratropium (ATROVENT) 42 mcg (0.06 %) nasal spray       Orders Placed This Encounter    predniSONE (DELTASONE) 20 mg tablet     Si tablets day for days 1 through 4, then 4 tablets on day 5, then 3 tablets on day 6, then 2 tablets on day 7, then 1 tablet on day 8. Dispense:  30 Tab     Refill:  0    pseudoephedrine CR (SUDAFED 12 HOUR) 120 mg CR tablet     Sig: Take 1 Tab by mouth two (2) times daily as needed for Congestion. Dispense:  20 Tab     Refill:  2    ipratropium (ATROVENT) 42 mcg (0.06 %) nasal spray     Si [place] by Both Nostrils route four (4) times daily.  Indications: Rhinorrhea     Dispense:  15 mL     Refill:  3   May consider restarting Flonase at a later time because it covers both allergic and nonallergic rhinitis    Follow-up Disposition: Not on Samantha Brown MD

## 2018-06-22 NOTE — PROGRESS NOTES
1. Have you been to the ER, urgent care clinic since your last visit? Hospitalized since your last visit? No    2. Have you seen or consulted any other health care providers outside of the [address] [place] [address] since your last visit? Include any pap smears or colon screening.  No

## 2018-06-22 NOTE — MR AVS SNAPSHOT
[place] 
 
 
 [phone] Patient: Alphonsus Prader MRN: [id number] Visit Information Date & Time Provider Department Dept. Phone Encounter #  
 6/22/2018  2:40 PM Micah Forde  [address] [facility] [phone] [id number] Your Appointments 9/25/2018  8:40 AM  
Follow Up with Micah Forde MD  
[facility] Appt Note: 3 month f/u  
 [address] [facility]  
[phone] [address] [facility] Upcoming Health Maintenance Date Due  
 PAP AKA CERVICAL CYTOLOGY 9/7/1981 FOBT Q 1 YEAR AGE 50-75 9/7/2010 Influenza Age 5 to Adult 8/1/2018 BREAST CANCER SCRN MAMMOGRAM 3/22/2019 DTaP/Tdap/Td series (2 - Td) 3/12/2028 Allergies as of 6/22/2018  Review Complete On: 6/22/2018 By: Dalton Hammonds LPN Severity Noted Reaction Type Reactions Prozac [Fluoxetine]  08/22/2013    Unknown (comments) Current Immunizations  Never Reviewed No immunizations on file. Not reviewed this visit Vitals BP Pulse Temp Resp Height(growth percentile) Weight(growth percentile) 117/78 (BP 1 Location: Left arm, BP Patient Position: Sitting) 85 98.8 °F (37.1 °C) (Oral) 18 5' 1\" (1.549 m) 210 lb 12.8 oz (95.6 kg) SpO2 BMI OB Status Smoking Status 99% 39.83 kg/m2 Perimenopausal Never Smoker Vitals History BMI and BSA Data Body Mass Index Body Surface Area  
 39.83 kg/m 2 2.03 m 2 Preferred Pharmacy Pharmacy Name [address] [phone] Your Updated Medication List  
  
   
This list is accurate as of 6/22/18  3:44 PM.  Always use your most recent med list.  
  
  
  
  
 cetirizine 10 mg tablet Commonly known as:  ZYRTEC  
 Take 1 Tab by mouth daily. Indications: Allergic Rhinitis  
  
 fluticasone 50 mcg/actuation nasal spray Commonly known as:  [place] Para 2 Sprays by Both Nostrils route daily. Indications: Allergic Rhinitis PARoxetine 20 mg tablet Commonly known as:  PAXIL Take 1 Tab by mouth daily. Up to 3 a week for FELIPE Introducing [facility]! Maxime Polanco introduces [employer] patient portal. Now you can access parts of your medical record, email your doctor's office, and request medication refills online. 1. In your internet browser, go to [email or web address] 2. Click on the First Time User? Click Here link in the Sign In box. You will see the New Member Sign Up page. 3. Enter your [employer] Access Code exactly as it appears below. You will not need to use this code after youve completed the sign-up process. If you do not sign up before the expiration date, you must request a new code. · [employer] Access Code: [email or web address] Expires: 9/18/2018  9:05 AM 
 
4. Enter the last four digits of your Social Security Number (xxxx) and Date of Birth (mm/dd/yyyy) as indicated and click Submit. You will be taken to the next sign-up page. 5. Create a [employer] ID. This will be your [employer] login ID and cannot be changed, so think of one that is secure and easy to remember. 6. Create a [employer] password. You can change your password at any time. 7. Enter your Password Reset Question and Answer. This can be used at a later time if you forget your password. 8. Enter your e-mail address. You will receive e-mail notification when new information is available in [address] 9. Click Sign Up. You can now view and download portions of your medical record. 10. Click the Download Summary menu link to download a portable copy of your medical information.  
 
If you have questions, please visit the Frequently Asked Questions section of the [employer]. Remember, [email or web address] is NOT to be used for urgent needs. For medical emergencies, dial 911. Now available from your iPhone and Android! Please provide this summary of care documentation to your next provider. Your primary care clinician is listed as Martin Bowen. If you have any questions after today's visit, please call [phone].

## 2018-08-16 ENCOUNTER — OFFICE VISIT (OUTPATIENT)
Dept: FAMILY MEDICINE CLINIC | Age: 58
End: 2018-08-16

## 2018-08-16 VITALS
DIASTOLIC BLOOD PRESSURE: 95 MMHG | HEIGHT: 61 IN | WEIGHT: 208 LBS | HEART RATE: 101 BPM | BODY MASS INDEX: 39.27 KG/M2 | OXYGEN SATURATION: 99 % | TEMPERATURE: 99 F | SYSTOLIC BLOOD PRESSURE: 166 MMHG | RESPIRATION RATE: 18 BRPM

## 2018-08-16 DIAGNOSIS — J02.0 STREP THROAT: Primary | ICD-10-CM

## 2018-08-16 DIAGNOSIS — R06.2 WHEEZING: ICD-10-CM

## 2018-08-16 DIAGNOSIS — J30.9 CHRONIC ALLERGIC RHINITIS: ICD-10-CM

## 2018-08-16 DIAGNOSIS — H61.22 IMPACTED CERUMEN OF LEFT EAR: ICD-10-CM

## 2018-08-16 DIAGNOSIS — J02.9 SORE THROAT: ICD-10-CM

## 2018-08-16 LAB
S PYO AG THROAT QL: POSITIVE
VALID INTERNAL CONTROL?: YES

## 2018-08-16 RX ORDER — LORATADINE 10 MG/1
10 TABLET ORAL DAILY
Qty: 30 TAB | Refills: 5 | Status: SHIPPED | OUTPATIENT
Start: 2018-08-16 | End: 2019-04-24 | Stop reason: SDUPTHER

## 2018-08-16 RX ORDER — AMOXICILLIN 500 MG/1
500 CAPSULE ORAL 2 TIMES DAILY
Qty: 20 CAP | Refills: 0 | Status: SHIPPED | OUTPATIENT
Start: 2018-08-16 | End: 2018-08-26

## 2018-08-16 RX ORDER — PSEUDOEPHEDRINE HCL 120 MG/1
120 TABLET, FILM COATED, EXTENDED RELEASE ORAL
Qty: 20 TAB | Refills: 2 | Status: SHIPPED | OUTPATIENT
Start: 2018-08-16 | End: 2018-08-16 | Stop reason: CLARIF

## 2018-08-16 RX ORDER — ALBUTEROL SULFATE 90 UG/1
2 AEROSOL, METERED RESPIRATORY (INHALATION)
Qty: 1 INHALER | Refills: 0 | Status: SHIPPED | OUTPATIENT
Start: 2018-08-16 | End: 2019-06-18

## 2018-08-16 NOTE — PATIENT INSTRUCTIONS

## 2018-08-16 NOTE — PROGRESS NOTES
1. Have you been to the ER, urgent care clinic since your last visit? Hospitalized since your last visit? No    2. Have you seen or consulted any other health care providers outside of the [address] [place] [address] since your last visit? Include any pap smears or colon screening.  No

## 2018-08-16 NOTE — PROGRESS NOTES
Subjective:     Chief Complaint   Patient presents with    Sore Throat    Cough       Jairo Mckay is a 62 y.o. female who presents today with c/o a sore throat x 7 days. Says it hurts to swallow. She's also had a runny nose, cough, and has a low grade fever of 99 today. She has been coughing up mucus that is thick and green. Has been going on for weeks. Has been taking OTC Claritin and dextromorphen but they are not helping. Denies any CP or SOB or wheezing. BP is elevated today, she does not have a hx of HTN and it is usually not this high. Will recheck it again at her 1 week follow-up appt. Patient Active Problem List   Diagnosis Code    FELIPE (generalized anxiety disorder) F41.1    Trigger finger of left thumb M65.312    Severe obesity (BMI 35.0-39.9) ([facility]) E66.01    Nonallergic rhinitis J31.0    Chronic allergic rhinitis J30.9       Past Medical History:   Diagnosis Date    Anxiety     Depression     Esophageal reflux     Insomnia     [facility]         Current Outpatient Prescriptions:     PARoxetine (PAXIL) 20 mg tablet, Take 1 Tab by mouth daily. Up to 3 a week for FELIPE, Disp: 30 Tab, Rfl: 5    predniSONE (DELTASONE) 20 mg tablet, 5 tablets day for days 1 through 4, then 4 tablets on day 5, then 3 tablets on day 6, then 2 tablets on day 7, then 1 tablet on day 8., Disp: 30 Tab, Rfl: 0    pseudoephedrine CR (SUDAFED 12 HOUR) 120 mg CR tablet, Take 1 Tab by mouth two (2) times daily as needed for Congestion. , Disp: 20 Tab, Rfl: 2    ipratropium (ATROVENT) 42 mcg (0.06 %) nasal spray, 1 [place] by Both Nostrils route four (4) times daily. Indications: Rhinorrhea, Disp: 15 mL, Rfl: 3    cetirizine (ZYRTEC) 10 mg tablet, Take 1 Tab by mouth daily. Indications: Allergic Rhinitis, Disp: 30 Tab, Rfl: 3    fluticasone (FLONASE) 50 mcg/actuation nasal spray, 2 Sprays by Both Nostrils route daily. Indications:  Allergic Rhinitis, Disp: 1 Bottle, Rfl: 0    Allergies Allergen Reactions    Prozac [Fluoxetine] Unknown (comments)       No past surgical history on file. History   Smoking Status    Never Smoker   Smokeless Tobacco    Never Used       Social History     Social History    Marital status:      Spouse name: N/A    Number of children: N/A    Years of education: N/A     Social History Main Topics    Smoking status: Never Smoker    Smokeless tobacco: Never Used    Alcohol use No    Drug use: No    Sexual activity: Not Asked     Other Topics Concern    None     Social History Narrative       Family History   Problem Relation Age of Onset    Diabetes Sister        ROS:  Gen: + fever, no chills or fatigue  HEENT: + runny nose and sore throat. + feels like ears are clogged. No nasal congestion or headaches. No eye problems  Resp: + productive cough. denies dyspnea or wheezing  CV: denies chest pain, pressure, or palpitations  Extremeties: denies edema  GI[de-identified] denies abdominal pain, nausea, or vomiting  Musculoskeletal: no myalagia  Neuro: denies dizziness  Skin: denies rashes or new lesions     Objective:     Visit Vitals    BP (!) 166/95 (BP 1 Location: Left arm, BP Patient Position: Sitting)    Pulse (!) 101    Temp 99 °F (37.2 °C) (Oral)    Resp 18    Ht 5' 1\" (1.549 m)    Wt 208 lb (94.3 kg)    SpO2 99%    BMI 39.3 kg/m2     Body mass index is 39.3 kg/(m^2). General: Alert and oriented. No acute distress, coughing throughout exam. Well nourished. HEENT :No sinus tenderness  Ears: L ear: + cerumen impaction, canal clear and TM pearly gray after flushing. R ear: canal and TM slightly erythematous    Eyes: Sclera white, conjunctiva clear. PERRLA. Extra ocular movements intact. Nose: Patent. Nasal mucosa erythematous, edematous, and without drainage. Mouth: Pharynx erythematous, without exudate or petechiae. + enlarged L tonsil  Neck: Supple with FROM. No lymphadenopathy  Lungs: Breathing even and unlabored.  Anterior lobes clear to auscultation bilaterally. Wheezing throughout bilateral posterior lobes. Heart :RRR, S1 and S2 normal intensity, no extra heart sounds  Extremities: Non-edematous  Mental status: Cognition, concentration, memory, and speech intact. Psych: Mood is pleasant and thought content appropriate for situation. Dressed appropriately and with good hygiene. Skin: Warm, dry, and intact. No lesions or discoloration. No results found for this visit on 08/16/18. Assessment/ Plan:     1. Strep throat  - AMB POC RAPID STREP A- positive  -Start Amoxicillin 500mg po BID x 10 days  -Take OTC tylenol or ibuprofen for pain and fever  -Throw toothbrush away after 2 days of antibx    2. Chronic allergic rhinitis  Recommend pt start using Flonase nasal spray every day along with OTC antihistamine. May use OTC Coricidin for acute cold symptoms until she is feeling better. Do not use Sudafed as this will make BP go up. F/U in 1 week to recheck BP    3. Wheezing  Start Albuterol 90mcg/act- Inhale 2 puffs q4 hours prn wheezing/ coughing spells  Notify PCP if you develop breathing problems or if cough worsens. F/U 1 week    4. Cerumen impaction, L ear  Left ear flushed with water and peroxide mixture. Left canal now clear. Orders Placed This Encounter    AMB POC RAPID STREP A         Verbal and written instructions (see AVS) provided.  Patient expresses understanding of diagnosis and treatment plan. Health Maintenance Due   Topic Date Due    PAP AKA CERVICAL CYTOLOGY  09/07/1981    FOBT Q 1 YEAR AGE 50-75  09/07/2010    Influenza Age 5 to Adult  08/01/2018         Follow-up Disposition: Not on File      Ana Kc NP

## 2018-08-16 NOTE — MR AVS SNAPSHOT
[address] 
 
 
 [address] [phone] Patient: Cortez Perdoom MRN: [id number] QAH:3/5/3323 Visit Information Date & Time Provider Department Dept. Phone Encounter #  
 8/16/2018  1:00 PM Eduardo Knight  [address] [facility] [phone] [id number] Follow-up Instructions Return in about 1 week (around 8/23/2018) for follow up. Follow-up and Disposition History Your Appointments 8/23/2018  9:00 AM  
Follow Up with SANTOSH Christy [facility] Appt Note: 1 wk f/u  
 [address] [facility]  
[phone] [address] [address]  
  
    
 9/25/2018  8:40 AM  
Follow Up with MD GRUPO Perry [address] [facility] ([facility] Appt Note: 3 month f/u  
 [address] [facility]  
[phone] [address] [facility] Upcoming Health Maintenance Date Due  
 PAP AKA CERVICAL CYTOLOGY 9/7/1981 FOBT Q 1 YEAR AGE 50-75 9/7/2010 Influenza Age 5 to Adult 8/1/2018 BREAST CANCER SCRN MAMMOGRAM 3/22/2019 DTaP/Tdap/Td series (2 - Td) 3/12/2028 Allergies as of 8/16/2018  Review Complete On: 8/16/2018 By: Eduardo Knight NP Severity Noted Reaction Type Reactions Prozac [Fluoxetine]  08/22/2013    Unknown (comments) Current Immunizations  Never Reviewed No immunizations on file. Not reviewed this visit You Were Diagnosed With   
  
 Codes Comments Strep throat    -  Primary ICD-10-CM: J02.0 ICD-9-CM: 034.0 Sore throat     ICD-10-CM: J02.9 ICD-9-CM: 751 Wheezing     ICD-10-CM: R06.2 ICD-9-CM: 786.07 Chronic allergic rhinitis     ICD-10-CM: J30.9 ICD-9-CM: 477.9 Impacted cerumen of left ear     ICD-10-CM: H61.22 
ICD-9-CM: 380.4 Vitals BP Pulse Temp Resp Height(growth percentile) Weight(growth percentile) (!) 166/95 (BP 1 Location: Left arm, BP Patient Position: Sitting) (!) 101 99 °F (37.2 °C) (Oral) 18 5' 1\" (1.549 m) 208 lb (94.3 kg) SpO2 BMI OB Status Smoking Status 99% 39.3 kg/m2 Perimenopausal Never Smoker Vitals History BMI and BSA Data Body Mass Index Body Surface Area  
 39.3 kg/m 2 2.01 m 2 Preferred Pharmacy Pharmacy Name Phone [address] [phone] Your Updated Medication List  
  
   
This list is accurate as of 8/16/18  1:42 PM.  Always use your most recent med list.  
  
  
  
  
 albuterol 90 mcg/actuation inhaler Commonly known as:  PROVENTIL HFA, VENTOLIN HFA, PROAIR HFA Take 2 Puffs by inhalation every four (4) hours as needed for Wheezing. amoxicillin 500 mg capsule Commonly known as:  AMOXIL Take 1 Cap by mouth two (2) times a day for 10 days. Indications: strep throat  
  
 fluticasone 50 mcg/actuation nasal spray Commonly known as:  Vinod Metro 2 Sprays by Both Nostrils route daily. Indications: Allergic Rhinitis  
  
 loratadine 10 mg tablet Commonly known as:  Elisa Faye Take 1 Tab by mouth daily. Indications: Allergic Rhinitis PARoxetine 20 mg tablet Commonly known as:  PAXIL Take 1 Tab by mouth daily. Up to 3 a week for FELIPE Prescriptions Sent to Pharmacy Refills  
 amoxicillin (AMOXIL) 500 mg capsule 0 Sig: Take 1 Cap by mouth two (2) times a day for 10 days. Indications: strep throat Class: Normal  
 Pharmacy: [facility] [address] [place] [address] [address] Ph #: [phone] Route: Oral  
 albuterol (PROVENTIL HFA, VENTOLIN HFA, PROAIR HFA) 90 mcg/actuation inhaler 0 Sig: Take 2 Puffs by inhalation every four (4) hours as needed for Wheezing.   
 Class: Normal  
 Pharmacy: [facility] [address], [address] [facility] [address] Ph #: [phone] Route: Inhalation  
 loratadine (CLARITIN) 10 mg tablet 5 Sig: Take 1 Tab by mouth daily. Indications: Allergic Rhinitis Class: Normal  
 Pharmacy: [facility] [address], [address] Ph #: [phone] Route: Oral  
  
We Performed the Following AMB POC RAPID STREP A [14432 CPT(R)] REMOVAL IMPACTED CERUMEN IRRIGATION/LVG UNILAT [id number] CPT(R)] Follow-up Instructions Return in about 1 week (around 8/23/2018) for follow up. Patient Instructions Strep Throat: Care Instructions Your Care Instructions Strep throat is a bacterial infection that causes sudden, severe sore throat and fever. Strep throat, which is caused by bacteria called streptococcus, is treated with antibiotics. Sometimes a strep test is necessary to tell if the sore throat is caused by strep bacteria. Treatment can help ease symptoms and may prevent future problems. Follow-up care is a key part of your treatment and safety. Be sure to make and go to all appointments, and call your doctor if you are having problems. It's also a good idea to know your test results and keep a list of the medicines you take. How can you care for yourself at home? · Take your antibiotics as directed. Do not stop taking them just because you feel better. You need to take the full course of antibiotics. · Strep throat can spread to others until 24 hours after you begin taking antibiotics. During this time, you should avoid contact with other people at work or home, especially infants and children. Do not sneeze or cough on others, and wash your hands often. Keep your drinking glass and eating utensils separate from those of others, and wash these items well in hot, soapy water. · Gargle with warm salt water at least once each hour to help reduce swelling and make your throat feel better. Use 1 teaspoon of salt mixed in 8 fluid ounces of warm water. · Take an over-the-counter pain medication, such as acetaminophen (Tylenol), ibuprofen (Advil, Motrin), or naproxen (Aleve). Read and follow all instructions on the label. · Try an over-the-counter anesthetic throat spray or throat lozenges, which may help relieve throat pain. · Drink plenty of fluids. Fluids may help soothe an irritated throat. Hot fluids, such as tea or soup, may help your throat feel better. · Eat soft solids and drink plenty of clear liquids. Flavored ice pops, ice cream, scrambled eggs, sherbet, and gelatin dessert (such as Jell-O) may also soothe the throat. · Get lots of rest. 
· Do not smoke, and avoid secondhand smoke. If you need help quitting, talk to your doctor about stop-smoking programs and medicines. These can increase your chances of quitting for good. · Use a vaporizer or humidifier to add moisture to the air in your bedroom. Follow the directions for cleaning the machine. When should you call for help? Call your doctor now or seek immediate medical care if: 
  · You have a new or higher fever.  
  · You have a fever with a stiff neck or severe headache.  
  · You have new or worse trouble swallowing.  
  · Your sore throat gets much worse on one side.  
  · Your pain becomes much worse on one side of your throat.  
 Watch closely for changes in your health, and be sure to contact your doctor if: 
  · You are not getting better after 2 days (48 hours).  
  · You do not get better as expected. Where can you learn more? Go to http://no-rodo.info/. Enter K625 in the search box to learn more about \"Strep Throat: Care Instructions. \" Current as of: May 12, 2017 Content Version: 11.7 © 6780-8663 [employer], "[employer]". Care instructions adapted under license by [employer] (which disclaims liability or warranty for this information).  If you have questions about a medical condition or this instruction, always ask your healthcare professional. [address]  any warranty or liability for your use of this information. Introducing [facility] & HEALTH SERVICES! [facility] introduces H5 patient portal. Now you can access parts of your medical record, email your doctor's office, and request medication refills online. 1. In your internet browser, go to [email or web address] 2. Click on the First Time User? Click Here link in the Sign In box. You will see the New Member Sign Up page. 3. Enter your H5 Access Code exactly as it appears below. You will not need to use this code after youve completed the sign-up process. If you do not sign up before the expiration date, you must request a new code. · H5 Access Code: [email or web address] Expires: 9/18/2018  9:05 AM 
 
4. Enter the last four digits of your Social Security Number (xxxx) and Date of Birth (mm/dd/yyyy) as indicated and click Submit. You will be taken to the next sign-up page. 5. Create a H5 ID. This will be your H5 login ID and cannot be changed, so think of one that is secure and easy to remember. 6. Create a H5 password. You can change your password at any time. 7. Enter your Password Reset Question and Answer. This can be used at a later time if you forget your password. 8. Enter your e-mail address. You will receive e-mail notification when new information is available in [address] 9. Click Sign Up. You can now view and download portions of your medical record. 10. Click the Download Summary menu link to download a portable copy of your medical information. If you have questions, please visit the Frequently Asked Questions section of the H5 website. Remember, H5 is NOT to be used for urgent needs. For medical emergencies, dial 911. Now available from your iPhone and Android! Please provide this summary of care documentation to your next provider. Your primary care clinician is listed as Selene Garcia. If you have any questions after today's visit, please call [phone].

## 2018-08-30 ENCOUNTER — OFFICE VISIT (OUTPATIENT)
Dept: FAMILY MEDICINE CLINIC | Age: 58
End: 2018-08-30

## 2018-08-30 VITALS
OXYGEN SATURATION: 96 % | HEIGHT: 61 IN | HEART RATE: 112 BPM | WEIGHT: 208 LBS | SYSTOLIC BLOOD PRESSURE: 109 MMHG | RESPIRATION RATE: 18 BRPM | TEMPERATURE: 99.2 F | BODY MASS INDEX: 39.27 KG/M2 | DIASTOLIC BLOOD PRESSURE: 70 MMHG

## 2018-08-30 DIAGNOSIS — J01.90 ACUTE NON-RECURRENT SINUSITIS, UNSPECIFIED LOCATION: ICD-10-CM

## 2018-08-30 DIAGNOSIS — Z29.9 PROPHYLACTIC MEASURE: ICD-10-CM

## 2018-08-30 DIAGNOSIS — J02.9 SORE THROAT: Primary | ICD-10-CM

## 2018-08-30 LAB
S PYO AG THROAT QL: NEGATIVE
VALID INTERNAL CONTROL?: YES

## 2018-08-30 RX ORDER — AMOXICILLIN AND CLAVULANATE POTASSIUM 562.5; 437.5; 62.5 MG/1; MG/1; MG/1
2 TABLET, FILM COATED, EXTENDED RELEASE ORAL 2 TIMES DAILY
Qty: 40 TAB | Refills: 0 | Status: SHIPPED | OUTPATIENT
Start: 2018-08-30 | End: 2018-09-04

## 2018-08-30 RX ORDER — PREDNISONE 20 MG/1
20 TABLET ORAL 2 TIMES DAILY
Qty: 10 TAB | Refills: 0 | Status: SHIPPED | OUTPATIENT
Start: 2018-08-30 | End: 2018-09-04

## 2018-08-30 NOTE — PROGRESS NOTES
1. Have you been to the ER, urgent care clinic since your last visit? Hospitalized since your last visit?no    2. Have you seen or consulted any other health care providers outside of the [address] [place] [address] since your last visit? Include any pap smears or colon screening.  no

## 2018-08-30 NOTE — PROGRESS NOTES
Subjective:     Chief Complaint   Patient presents with    Sore Throat     left side        Awilda Bae is a 62 y.o. female who presents today with c/o ongoing sore throat. Was seen 2 weeks ago and tested positive for strep throat. Completed a 10 day course of Amoxicillin and threw out her toothbrush 2 days later. Says she felt much better for 5 days then started feeling bad again. Today she is still complaining of soreness on the left side of her throat, ear, and face. She is also having nasal congestion and runny nose, as well as coughing up green mucus. Also having some wheezing and is using her albuterol inhaler. Denies CP or SOB. Mild fever of 99.2. Patient Active Problem List   Diagnosis Code    FELIPE (generalized anxiety disorder) F41.1    Trigger finger of left thumb M65.312    Severe obesity (BMI 35.0-39.9) ([facility]) E66.01    Nonallergic rhinitis J31.0    Chronic allergic rhinitis J30.9       Past Medical History:   Diagnosis Date    Anxiety     Depression     Esophageal reflux     Insomnia     [facility]) 03/2012         Current Outpatient Prescriptions:     albuterol (PROVENTIL HFA, VENTOLIN HFA, PROAIR HFA) 90 mcg/actuation inhaler, Take 2 Puffs by inhalation every four (4) hours as needed for Wheezing., Disp: 1 Inhaler, Rfl: 0    loratadine (CLARITIN) 10 mg tablet, Take 1 Tab by mouth daily. Indications: Allergic Rhinitis, Disp: 30 Tab, Rfl: 5    fluticasone (FLONASE) 50 mcg/actuation nasal spray, 2 Sprays by Both Nostrils route daily. Indications: Allergic Rhinitis, Disp: 1 Bottle, Rfl: 0    PARoxetine (PAXIL) 20 mg tablet, Take 1 Tab by mouth daily. Up to 3 a week for FELIPE, Disp: 30 Tab, Rfl: 5    Allergies   Allergen Reactions    Prozac [Fluoxetine] Unknown (comments)       No past surgical history on file.     History   Smoking Status    Never Smoker   Smokeless Tobacco    Never Used       Social History     Social History    Marital status:  Spouse name: N/A    Number of children: N/A    Years of education: N/A     Social History Main Topics    Smoking status: Never Smoker    Smokeless tobacco: Never Used    Alcohol use No    Drug use: No    Sexual activity: Not on file     Other Topics Concern    Not on file     Social History Narrative       Family History   Problem Relation Age of Onset    Diabetes Sister        ROS:  Gen: + fever, chills, and fatigue  HEENT:+ H/A, nasal congestion, runny nose, L ear pain, and sore throat  Resp: + productive cough and wheezing. No CP or SOB  CV: denies chest pain, pressure, or palpitations  Extremeties: denies edema, pallor, or cyanosis  Musculoskeletal: no myalgia  Neuro: denies dizziness  Skin: denies rashes or new lesions   Heme: no lymphadenopathy     Objective:     Visit Vitals    /70    Pulse (!) 112    Temp 99.2 °F (37.3 °C)    Resp 18    Ht 5' 1\" (1.549 m)    Wt 208 lb (94.3 kg)    SpO2 96%    BMI 39.3 kg/m2     Body mass index is 39.3 kg/(m^2). General: Alert and oriented. No acute distress. Well nourished. HEENT : No sinus tenderness  Ears:TMs normal. Canals clear but moderate amt of wax present in L ear. Eyes: Sclera white, conjunctiva clear. PERRLA. Extra ocular movements intact. Nose: Patent. Nasal mucosa pink, non-edematous, and without drainage. Mouth: Pharynx erythematous, without exudate   Neck: Supple with FROM. No lymphadenopathy  Lungs: Breathing even and unlabored. All lobes clear to auscultation bilaterally   Heart :RRR, S1 and S2 normal intensity, no extra heart sounds  Extremities: Non-edematous  Skin: Warm, dry, and intact. No lesions or discoloration. No results found for this visit on 08/30/18. Assessment/ Plan:     1. Acute sinusitis  Repeat Strep test negative  Start Augmentin 1000-125mg- take 2 tabs po BID x 10 days  Cont Flonase and Claritin. Start Prednisone 20mg po BID x 5 days to decrease inflammation.   F/U next week if symptoms worsen or do not improve. 2. Prophylactic measure  Start probiotic- 1 tab po daily to prevent antibiotic-associated diarrhea. Verbal and written instructions (see AVS) provided.  Patient expresses understanding of diagnosis and treatment plan. Health Maintenance Due   Topic Date Due    PAP AKA CERVICAL CYTOLOGY  09/07/1981    FOBT Q 1 YEAR AGE 50-75  09/07/2010    Influenza Age 5 to Adult  08/01/2018         Follow-up Disposition: Not on File      Ana Yung, SANTOSH

## 2018-08-30 NOTE — MR AVS SNAPSHOT
P[address] 
 
 
 [address] [place] [address] [phone] Patient: Pina Bailey MRN: [id number] NRL:4/4/4806 Visit Information Date & Time Provider Department Dept. Phone Encounter #  
 8/30/2018  2:00 PM Ras Ludwig  [address] [facility] [phone] [id number] Follow-up Instructions Return if symptoms worsen or fail to improve. Follow-up and Disposition History Your Appointments 9/25/2018  8:40 AM  
Follow Up with Katarina Orozco MD  
[facility] PRIMARY CARE AT [address] [facility]) Appt Note: 3 month f/u; 3 mo f/u  
 [facility] [address] [address] [phone] [address] [facility] Upcoming Health Maintenance Date Due  
 PAP AKA CERVICAL CYTOLOGY 9/7/1981 FOBT Q 1 YEAR AGE 50-75 9/7/2010 Influenza Age 5 to Adult 8/1/2018 BREAST CANCER SCRN MAMMOGRAM 3/22/2019 DTaP/Tdap/Td series (2 - Td) 3/12/2028 Allergies as of 8/30/2018  Review Complete On: 8/16/2018 By: Ras Ludwig NP Severity Noted Reaction Type Reactions Prozac [Fluoxetine]  08/22/2013    Unknown (comments) Current Immunizations  Never Reviewed No immunizations on file. Not reviewed this visit You Were Diagnosed With   
  
 Codes Comments Sore throat    -  Primary ICD-10-CM: J02.9 ICD-9-CM: 216 Acute non-recurrent sinusitis, unspecified location     ICD-10-CM: J01.90 ICD-9-CM: 461.9 Prophylactic measure     ICD-10-CM: Z29.9 ICD-9-CM: V07.9 Vitals BP Pulse Temp Resp Height(growth percentile) Weight(growth percentile) 109/70 (!) 112 99.2 °F (37.3 °C) 18 5' 1\" (1.549 m) 208 lb (94.3 kg) SpO2 BMI OB Status Smoking Status 96% 39.3 kg/m2 Perimenopausal Never Smoker BMI and BSA Data Body Mass Index Body Surface Area  
 39.3 kg/m 2 2.01 m 2 [facility] [address] [place] [address] [phone] Your Updated Medication List  
  
   
This list is accurate as of 8/30/18  2:20 PM.  Always use your most recent med list.  
  
  
  
  
 albuterol 90 mcg/actuation inhaler Commonly known as:  PROVENTIL HFA, VENTOLIN HFA, PROAIR HFA Take 2 Puffs by inhalation every four (4) hours as needed for Wheezing. amoxicillin-clavulanate 1,000-62.5 mg per tablet Commonly known as:  AUGMENTIN Take 2 Tabs by mouth two (2) times a day for 10 days. fluticasone 50 mcg/actuation nasal spray Commonly known as:  Elihue Caller 2 Sprays by Both Nostrils route daily. Indications: Allergic Rhinitis L.acidoph & parac-S.therm-Bifido 16 billion cell Cap cap Commonly known as:  SINDY Q2/RISAQUAD-2 Take 1 Cap by mouth daily. loratadine 10 mg tablet Commonly known as:  Kathlyne Old Take 1 Tab by mouth daily. Indications: Allergic Rhinitis PARoxetine 20 mg tablet Commonly known as:  PAXIL Take 1 Tab by mouth daily. Up to 3 a week for FELIPE  
  
 predniSONE 20 mg tablet Commonly known as:  Merle James Take 1 Tab by mouth two (2) times a day for 5 days. Prescriptions Sent to Pharmacy Refills  
 amoxicillin-clavulanate (AUGMENTIN) 1,000-62.5 mg per tablet 0 Sig: Take 2 Tabs by mouth two (2) times a day for 10 days. Class: Normal  
 Pharmacy: [address] [facility] [address], [address] Ph #: [phone] Route: Oral  
 predniSONE (DELTASONE) 20 mg tablet 0 Sig: Take 1 Tab by mouth two (2) times a day for 5 days. Class: Normal  
 Pharmacy: [address] [facility] [address], [address] Ph #: [phone] Route: Oral  
 L.acidoph & parac-S.therm-Bifido (SINDY Q2/RISAQUAD-2) 16 billion cell cap cap 0 Sig: Take 1 Cap by mouth daily.   
 Class: Normal  
 Pharmacy: [facility] [address] [place] [address] [address] Ph #: [phone] Route: Oral  
  
We Performed the Following AMB POC RAPID STREP A [65756 CPT(R)] Follow-up Instructions Return if symptoms worsen or fail to improve. Patient Instructions Sinusitis: Care Instructions Your Care Instructions Sinusitis is an infection of the lining of the sinus cavities in your head. Sinusitis often follows a cold. It causes pain and pressure in your head and face. In most cases, sinusitis gets better on its own in 1 to 2 weeks. But some mild symptoms may last for several weeks. Sometimes antibiotics are needed. Follow-up care is a key part of your treatment and safety. Be sure to make and go to all appointments, and call your doctor if you are having problems. It's also a good idea to know your test results and keep a list of the medicines you take. How can you care for yourself at home? · Take an over-the-counter pain medicine, such as acetaminophen (Tylenol), ibuprofen (Advil, Motrin), or naproxen (Aleve). Read and follow all instructions on the label. · If the doctor prescribed antibiotics, take them as directed. Do not stop taking them just because you feel better. You need to take the full course of antibiotics. · Be careful when taking over-the-counter cold or flu medicines and Tylenol at the same time. Many of these medicines have acetaminophen, which is Tylenol. Read the labels to make sure that you are not taking more than the recommended dose. Too much acetaminophen (Tylenol) can be harmful. · Breathe warm, moist air from a steamy shower, a hot bath, or a sink filled with hot water. Avoid cold, dry air. Using a humidifier in your home may help. Follow the directions for cleaning the machine. · Use saline (saltwater) nasal washes to help keep your nasal passages open and wash out mucus and bacteria.  You can buy saline nose drops at a grocery store or drugstore. Or you can make your own at home by adding 1 teaspoon of salt and 1 teaspoon of baking soda to 2 cups of distilled water. If you make your own, fill a bulb syringe with the solution, insert the tip into your nostril, and squeeze gently. [place]  your nose. · Put a hot, wet towel or a warm gel pack on your face 3 or 4 times a day for 5 to 10 minutes each time. · Try a decongestant nasal spray like oxymetazoline (Afrin). Do not use it for more than 3 days in a row. Using it for more than 3 days can make your congestion worse. When should you call for help? Call your doctor now or seek immediate medical care if: 
  · You have new or worse swelling or redness in your face or around your eyes.  
  · You have a new or higher fever.  
 Watch closely for changes in your health, and be sure to contact your doctor if: 
  · You have new or worse facial pain.  
  · The mucus from your nose becomes thicker (like pus) or has new blood in it.  
  · You are not getting better as expected. Where can you learn more? Go to http://no-rodo.info/. Enter P334 in the search box to learn more about \"Sinusitis: Care Instructions. \" Current as of: May 12, 2017 Content Version: 11.7 © 3474-4250 [employer]. Care instructions adapted under license by [employer] (which disclaims liability or warranty for this information). If you have questions about a medical condition or this instruction, always ask your healthcare professional. [place] [address] any warranty or liability for your use of this information. Patient Instructions History Introducing [facility] & HEALTH SERVICES! [employer] introduces Sunrun patient portal. Now you can access parts of your medical record, email your doctor's office, and request medication refills online. 1. In your internet browser, go to [email or web address] 2. Click on the First Time User? Click Here link in the Sign In box. You will see the New Member Sign Up page. 3. Enter your Terascala Access Code exactly as it appears below. You will not need to use this code after youve completed the sign-up process. If you do not sign up before the expiration date, you must request a new code. · Terascala Access Code: [email or web address] Expires: 9/18/2018  9:05 AM 
 
4. Enter the last four digits of your Social Security Number (xxxx) and Date of Birth (mm/dd/yyyy) as indicated and click Submit. You will be taken to the next sign-up page. 5. Create a Terascala ID. This will be your Terascala login ID and cannot be changed, so think of one that is secure and easy to remember. 6. Create a Terascala password. You can change your password at any time. 7. Enter your Password Reset Question and Answer. This can be used at a later time if you forget your password. 8. Enter your e-mail address. You will receive e-mail notification when new information is available in [address] 9. Click Sign Up. You can now view and download portions of your medical record. 10. Click the Download Summary menu link to download a portable copy of your medical information. If you have questions, please visit the Frequently Asked Questions section of the Terascala website. Remember, Terascala is NOT to be used for urgent needs. For medical emergencies, dial 911. Now available from your iPhone and Android! Please provide this summary of care documentation to your next provider. Your primary care clinician is listed as Karuna Payan. If you have any questions after today's visit, please call [phone].

## 2018-08-30 NOTE — PATIENT INSTRUCTIONS
Sinusitis: Care Instructions  Your Care Instructions    Sinusitis is an infection of the lining of the sinus cavities in your head. Sinusitis often follows a cold. It causes pain and pressure in your head and face. In most cases, sinusitis gets better on its own in 1 to 2 weeks. But some mild symptoms may last for several weeks. Sometimes antibiotics are needed. Follow-up care is a key part of your treatment and safety. Be sure to make and go to all appointments, and call your doctor if you are having problems. It's also a good idea to know your test results and keep a list of the medicines you take. How can you care for yourself at home? · Take an over-the-counter pain medicine, such as acetaminophen (Tylenol), ibuprofen (Advil, Motrin), or naproxen (Aleve). Read and follow all instructions on the label. · If the doctor prescribed antibiotics, take them as directed. Do not stop taking them just because you feel better. You need to take the full course of antibiotics. · Be careful when taking over-the-counter cold or flu medicines and Tylenol at the same time. Many of these medicines have acetaminophen, which is Tylenol. Read the labels to make sure that you are not taking more than the recommended dose. Too much acetaminophen (Tylenol) can be harmful. · Breathe warm, moist air from a steamy shower, a hot bath, or a sink filled with hot water. Avoid cold, dry air. Using a humidifier in your home may help. Follow the directions for cleaning the machine. · Use saline (saltwater) nasal washes to help keep your nasal passages open and wash out mucus and bacteria. You can buy saline nose drops at a grocery store or drugstore. Or you can make your own at home by adding 1 teaspoon of salt and 1 teaspoon of baking soda to 2 cups of distilled water. If you make your own, fill a bulb syringe with the solution, insert the tip into your nostril, and squeeze gently. Willia Slocumb your nose.   · Put a hot, wet towel or a warm gel pack on your face 3 or 4 times a day for 5 to 10 minutes each time. · Try a decongestant nasal spray like oxymetazoline (Afrin). Do not use it for more than 3 days in a row. Using it for more than 3 days can make your congestion worse. When should you call for help? Call your doctor now or seek immediate medical care if:    · You have new or worse swelling or redness in your face or around your eyes.     · You have a new or higher fever.    Watch closely for changes in your health, and be sure to contact your doctor if:    · You have new or worse facial pain.     · The mucus from your nose becomes thicker (like pus) or has new blood in it.     · You are not getting better as expected. Where can you learn more? Go to http://no-rodo.info/. Enter O218 in the search box to learn more about \"Sinusitis: Care Instructions. \"  Current as of: May 12, 2017  Content Version: 11.7  © 9897-5750 [employer]. Care instructions adapted under license by [employer] (which disclaims liability or warranty for this information). If you have questions about a medical condition or this instruction, always ask your healthcare professional. [address] any warranty or liability for your use of this information.

## 2019-07-15 PROBLEM — E78.00 PURE HYPERCHOLESTEROLEMIA: Chronic | Status: ACTIVE | Noted: 2019-07-15

## 2019-08-20 ENCOUNTER — OFFICE VISIT (OUTPATIENT)
Dept: SLEEP MEDICINE | Age: 59
End: 2019-08-20

## 2019-08-20 VITALS
SYSTOLIC BLOOD PRESSURE: 133 MMHG | WEIGHT: 215.6 LBS | OXYGEN SATURATION: 98 % | HEART RATE: 98 BPM | BODY MASS INDEX: 40.7 KG/M2 | DIASTOLIC BLOOD PRESSURE: 80 MMHG | HEIGHT: 61 IN

## 2019-08-20 DIAGNOSIS — F41.9 ANXIETY AND DEPRESSION: ICD-10-CM

## 2019-08-20 DIAGNOSIS — F32.A ANXIETY AND DEPRESSION: ICD-10-CM

## 2019-08-20 DIAGNOSIS — G47.33 OSA (OBSTRUCTIVE SLEEP APNEA): Primary | ICD-10-CM

## 2019-08-20 NOTE — PATIENT INSTRUCTIONS
[address] [facility] [place]. [place], [address]  Tel.  [phone]  Fax. 100 [facility] 60  [place], [address] [facility]  Tel.  [phone]  Fax. [phone] [address]  Tel.  [phone]  Fax. [phone]     Sleep Apnea: After Your Visit  Your Care Instructions  Sleep apnea occurs when you frequently stop breathing for 10 seconds or longer during sleep. It can be mild to severe, based on the number of times per hour that you stop breathing or have slowed breathing. Blocked or narrowed airways in your nose, mouth, or throat can cause sleep apnea. Your airway can become blocked when your throat muscles and tongue relax during sleep. Sleep apnea is common, occurring in 1 out of 20 individuals. Individuals having any of the following characteristics should be evaluated and treated right away due to high risk and detrimental consequences from untreated sleep apnea:  1. Obesity  2. Congestive Heart failure  3. Atrial Fibrillation  4. Uncontrolled Hypertension  5. Type II Diabetes  6. Night-time Arrhythmias  7. Stroke  8. Pulmonary Hypertension  9. High-risk Driving Populations (pilots, truck drivers, etc.)  10. Patients Considering Weight-loss Surgery    How do you know you have sleep apnea? You probably have sleep apnea if you answer 'yes' to 3 or more of the following questions:  S - Have you been told that you Snore? T - Are you often Tired during the day? O - Has anyone Observed you stop breathing while sleeping? P- Do you have (or are being treated for) high blood Pressure? B - Are you obese (Body Mass Index > 35)? A - Is your Age 48years old or older? N - Is your Neck size greater than 16 inches? G - Are you male Gender? A sleep physician can prescribe a breathing device that prevents tissues in the throat from blocking your airway.  Or your doctor may recommend using a dental device (oral breathing device) to help keep your airway open. In some cases, surgery may be needed to remove enlarged tissues in the throat. Follow-up care is a key part of your treatment and safety. Be sure to make and go to all appointments, and call your doctor if you are having problems. It's also a good idea to know your test results and keep a list of the medicines you take. How can you care for yourself at home? · Lose weight, if needed. It may reduce the number of times you stop breathing or have slowed breathing. · Go to bed at the same time every night. · Sleep on your side. It may stop mild apnea. If you tend to roll onto your back, sew a pocket in the back of your pajama top. Put a tennis ball into the pocket, and stitch the pocket shut. This will help keep you from sleeping on your back. · Avoid alcohol and medicines such as sleeping pills and sedatives before bed. · Do not smoke. Smoking can make sleep apnea worse. If you need help quitting, talk to your doctor about stop-smoking programs and medicines. These can increase your chances of quitting for good. · Prop up the head of your bed 4 to 6 inches by putting bricks under the legs of the bed. · Treat breathing problems, such as a stuffy nose, caused by a cold or allergies. · Use a continuous positive airway pressure (CPAP) breathing machine if lifestyle changes do not help your apnea and your doctor recommends it. The machine keeps your airway from closing when you sleep. · If CPAP does not help you, ask your doctor whether you should try other breathing machines. A bilevel positive airway pressure machine has two types of air pressureâone for breathing in and one for breathing out. Another device raises or lowers air pressure as needed while you breathe. · If your nose feels dry or bleeds when using one of these machines, talk with your doctor about increasing moisture in the air. A humidifier may help.   · If your nose is runny or stuffy from using a breathing machine, talk with your doctor about using decongestants or a corticosteroid nasal spray. When should you call for help? Watch closely for changes in your health, and be sure to contact your doctor if:  · You still have sleep apnea even though you have made lifestyle changes. · You are thinking of trying a device such as CPAP. · You are having problems using a CPAP or similar machine. Where can you learn more? Go to [employer]. Enter O837 in the search box to learn more about \"Sleep Apnea: After Your Visit. \"   © 1546-2999 [employer]wise, Incorporated. Care instructions adapted under license by [facility] (which disclaims liability or warranty for this information). This care instruction is for use with your licensed healthcare professional. If you have questions about a medical condition or this instruction, always ask your healthcare professional. Eneida Greet any warranty or liability for your use of this information. PROPER SLEEP HYGIENE    What to avoid  · Do not have drinks with caffeine, such as coffee or black tea, for 8 hours before bed. · Do not smoke or use other types of tobacco near bedtime. Nicotine is a stimulant and can keep you awake. · Avoid drinking alcohol late in the evening, because it can cause you to wake in the middle of the night. · Do not eat a big meal close to bedtime. If you are hungry, eat a light snack. · Do not drink a lot of water close to bedtime, because the need to urinate may wake you up during the night. · Do not read or watch TV in bed. Use the bed only for sleeping and sexual activity. What to try  · Go to bed at the same time every night, and wake up at the same time every morning. Do not take naps during the day. · Keep your bedroom quiet, dark, and cool. · Get regular exercise, but not within 3 to 4 hours of your bedtime. .  · Sleep on a comfortable pillow and mattress.   · If watching the clock makes you anxious, turn it facing away from you so you cannot see the time. · If you worry when you lie down, start a worry book. Well before bedtime, write down your worries, and then set the book and your concerns aside. · Try meditation or other relaxation techniques before you go to bed. · If you cannot fall asleep, get up and go to another room until you feel sleepy. Do something relaxing. Repeat your bedtime routine before you go to bed again. · Make your house quiet and calm about an hour before bedtime. Turn down the lights, turn off the TV, log off the computer, and turn down the volume on music. This can help you relax after a busy day. Drowsy Driving  The [address] [place] [address] Road Traffic Safety Administration cites drowsiness as a causing factor in more than 852,638 police reported crashes annually, resulting in 76,000 injuries and 1,500 deaths. Other surveys suggest 55% of people polled have driven while drowsy in the past year, 23% had fallen asleep but not crashed, 3% crashed, and 2% had and accident due to drowsy driving. Who is at risk? Young Drivers: One study of drowsy driving accidents states that 55% of the drivers were under 25 years. Of those, 75% were male. Shift Workers and Travelers: People who work overnight or travel across time zones frequently are at higher risk of experiencing Circadian Rhythm Disorders. They are trying to work and function when their body is programed to sleep. Sleep Deprived: Lack of sleep has a serious impact on your ability to pay attention or focus on a task. Consistently getting less than the average of 8 hours your body needs creates partial or cumulative sleep deprivation. Untreated Sleep Disorders: Sleep Apnea, Narcolepsy, R.L.S., and other sleep disorders (untreated) prevent a person from getting enough restful sleep. This leads to excessive daytime sleepiness and increases the risk for drowsy driving accidents by up to 7 times.   Medications / Alcohol: Even over the counter medications can cause drowsiness. Medications that impair a drivers attention should have a warning label. Alcohol naturally makes you sleepy and on its own can cause accidents. Combined with excessive drowsiness its effects are amplified. Signs of Drowsy Driving:   * You don't remember driving the last few miles   * You may drift out of your paradise   * You are unable to focus and your thoughts wander   * You may yawn more often than normal   * You have difficulty keeping your eyes open / nodding off   * Missing traffic signs, speeding, or tailgating  Prevention-   Good sleep hygiene, lifestyle and behavioral choices have the most impact on drowsy driving. There is no substitute for sleep and the average person requires 8 hours nightly. If you find yourself driving drowsy, stop and sleep. Consider the sleep hygiene tips provided during your visit as well. Medication Refill Policy: Refills for all medications require 1 week advance notice. Please have your pharmacy fax a refill request. We are unable to fax, or call in \"controled substance\" medications and you will need to pick these prescriptions up from our office. algrano Activation    Thank you for requesting access to algrano. Please follow the instructions below to securely access and download your online medical record. algrano allows you to send messages to your doctor, view your test results, renew your prescriptions, schedule appointments, and more. How Do I Sign Up? 1. In your internet browser, go to [email or web address]. 2. Click on the First Time User? Click Here link in the Sign In box. You will see the New Member Sign Up page. 3. Enter your algrano Access Code exactly as it appears below. You will not need to use this code after youve completed the sign-up process. If you do not sign up before the expiration date, you must request a new code.     algrano Access Code: [email or web address]  Expires: 10/4/2019  4:17 PM (This is the date your Dymant access code will )    4. Enter the last four digits of your Social Security Number (xxxx) and Date of Birth (mm/dd/yyyy) as indicated and click Submit. You will be taken to the next sign-up page. 5. Create a TrustGot ID. This will be your Dymant login ID and cannot be changed, so think of one that is secure and easy to remember. 6. Create a Dymant password. You can change your password at any time. 7. Enter your Password Reset Question and Answer. This can be used at a later time if you forget your password. 8. Enter your e-mail address. You will receive e-mail notification when new information is available in [address] 9. Click Sign Up. You can now view and download portions of your medical record. 10. Click the Download Summary menu link to download a portable copy of your medical information. Additional Information    If you have questions, please call [phone]. Remember, Dymant is NOT to be used for urgent needs. For medical emergencies, dial 911.

## 2019-08-20 NOTE — PROGRESS NOTES
[address] [facility] [address] [place]. [place], [address]  Tel.  [phone]  Fax. 100 [facility] 60  [place], [address] [facility]  Tel.  [phone]  Fax. [phone] [address] [facility] [address]   Tel.  [phone]  Fax. [phone]         Subjective:      Jose Roberto Levine is an 62 y.o. female referred for evaluation for a sleep disorder. She complains of snoring associated with awakening in the middle of the night because of no specific reason. Symptoms began several years ago, gradually worsening since that time. She usually can fall asleep in 60 - 90 minutes after taking Gabapentin 600 mg and Ambien 10 mg at bedtime but wakes up feeling very tired the next morning. Family or house members note snoring. She denies completely or partially paralyzed while falling asleep or waking up. Jose Roberto Levine does wake up frequently at night. She is bothered by waking up too early and left unable to get back to sleep. She actually sleeps about 2 hours at night and wakes up about   times during the night. She does not work shifts: Beulah Ibrahim indicates she does get too little sleep at night. Her bedtime is 2200. She awakens at 0400. She does take naps. She takes 5 naps a week lasting 10 to 15 minutes. She has the following observed behaviors: Loud snoring, Twitching of legs or feet; Nightmares. Other remarks:     CO2 27   18 - 29 mmol/L         [place] Sleepiness Score: 14 which reflect moderate daytime drowsiness. Allergies   Allergen Reactions    Prozac [Fluoxetine] Unknown (comments)     Made depression worse, suicidal thoughts           Current Outpatient Medications:     zolpidem (AMBIEN) 10 mg tablet, Take 1 Tab by mouth nightly as needed for Sleep. Max Daily Amount: 10 mg., Disp: 30 Tab, Rfl: 0    gabapentin (NEURONTIN) 600 mg tablet, Take 1 Tab by mouth nightly.  Max Daily Amount: 600 mg., Disp: 30 Tab, Rfl: 2    omeprazole (PRILOSEC OTC) 20 mg tablet, Take 20 mg by mouth daily. , Disp: , Rfl:     PARoxetine (PAXIL) 20 mg tablet, Take 0.5 Tabs by mouth daily. , Disp: 30 Tab, Rfl: 5    loratadine (CLARITIN) 10 mg tablet, Take 1 Tab by mouth daily. Indications: inflammation of the nose due to an allergy, Disp: 30 Tab, Rfl: 5     She  has a past medical history of Anxiety, Depression, Esophageal reflux, Insomnia, and [facility] spotted fever) (03/2012). She  has no past surgical history on file. She family history includes Diabetes in her sister. She  reports that she has never smoked. She has never used smokeless tobacco. She reports that she does not drink alcohol or use drugs. Review of Systems:  Constitutional:  No significant weight loss or weight gain  Eyes:  No blurred vision  CVS:  No significant chest pain  Pulm:  No significant shortness of breath  GI:  No significant nausea or vomiting  :   significant nocturia  Musculoskeletal:  No significant joint pain at night  Skin:  No significant rashes  Neuro:  No significant dizziness   Psych:  No active mood issues    Sleep Review of Systems: notable for difficulty falling asleep; frequent awakenings at night;  regular dreaming noted;  nightmares ; no early morning headaches; memory problems; concentration issues; no history of any automobile or occupational accidents due to daytime drowsiness. Objective:     Visit Vitals  /80 (BP 1 Location: Left arm, BP Patient Position: Sitting)   Pulse 98   Ht 5' 1\" (1.549 m)   Wt 215 lb 9.6 oz (97.8 kg)   SpO2 98%   BMI 40.74 kg/m²         General:   Not in acute distress   Eyes:  Anicteric sclerae, no obvious strabismus   Nose:  No obvious nasal septum deviation    Oropharynx:   Class 3 oropharyngeal outlet, thick tongue base, uvula small, low-lying soft palate, narrow tonsilo-pharyngeal pilars   Tonsils:   tonsils are not seen due to low-lying soft palate   Neck:   Neck circ.  in \"inches\": 16; midline trachea   Chest/Lungs:  Equal lung expansion, clear on auscultation    CVS:  Normal rate, regular rhythm; no JVD   Skin:  Warm to touch; no obvious rashes   Neuro:  No focal deficits ; no obvious tremor    Psych:  Normal affect,  normal countenance;          Assessment:       ICD-10-CM ICD-9-CM    1. ANA MARIA (obstructive sleep apnea) G47.33 327.23 POLYSOMNOGRAPHY 1 NIGHT   2. Sleep-related hypoventilation G47.36 327.29 POLYSOMNOGRAPHY 1 NIGHT   3. BMI 40.0-44.9, adult ([facility] [address] Z68.41 V85.41    4. Anxiety and depression F41.9 300.00     F32.9 311          Plan:     * The patient currently has a Moderate Risk for having sleep apnea. STOP-BANG score 5.  * Sleep testing was ordered for initial evaluation. * She was provided information on sleep apnea / hypoventilation including coresponding risk factors and the importance of proper treatment. * Treatment options if indicated were reviewed today. Patient agrees to a trial of PAP therapy if indicated. * Counseling was provided regarding proper sleep hygiene (including effect of light on sleep), paradoxical intention, stimulus control, sleep environment safety and safe driving. * Consider decrease in dose of Ambien by 50%. * Effect of sleep disturbance on weight was reviewed. We have recommended a dedicated weight loss through appropriate diet and an exercise regiment as significant weight reduction has been shown to reduce severity of obstructive sleep apnea. * Patient agrees to telephone [phone]  follow-up by myself or lead sleep technologist shortly after sleep study to review results and plan final management.     (patient has given permission for a message to be left regarding test results and further management if patient cannot be cannot be reached directly). Thank you for allowing us to participate in your patient's medical care. We'll keep you updated on these investigations. Joao Jane MD, FAASM  Electronically signed.  08/20/19

## 2019-10-18 ENCOUNTER — TELEPHONE (OUTPATIENT)
Dept: SLEEP MEDICINE | Age: 59
End: 2019-10-18

## 2019-10-18 DIAGNOSIS — G47.33 OSA (OBSTRUCTIVE SLEEP APNEA): Primary | ICD-10-CM

## 2019-10-20 ENCOUNTER — HOSPITAL ENCOUNTER (OUTPATIENT)
Dept: SLEEP MEDICINE | Age: 59
Discharge: HOME | End: 2019-10-20
Payer: SELF-PAY

## 2019-10-20 DIAGNOSIS — G47.33 OSA (OBSTRUCTIVE SLEEP APNEA): ICD-10-CM

## 2019-10-20 PROCEDURE — 95810 POLYSOM 6/> YRS 4/> PARAM: CPT | Performed by: INTERNAL MEDICINE

## 2019-10-22 NOTE — TELEPHONE ENCOUNTER
Orders Placed This Encounter    21937 POLYSOMNOGRAPHY (1st NIGHT STUDY) SPLIT IF MEETS CRITERIA     Polysomnogram w/EtCO2 verbal order per Dr. Cindy Fay. Standing Status:   Future     Standing Expiration Date:   4/22/2020     Order Specific Question:   Reason for Exam     Answer:   ANA MARIA / [facility]  Dx: ANA MARIA (obstructive sleep apnea) [G47.33 (ICD-10-CM)];  Sleep-related hypoventilation [G47.36 (ICD-10-CM)]

## 2019-10-31 ENCOUNTER — TELEPHONE (OUTPATIENT)
Dept: SLEEP MEDICINE | Age: 59
End: 2019-10-31

## 2019-10-31 DIAGNOSIS — G47.33 OSA (OBSTRUCTIVE SLEEP APNEA): Primary | ICD-10-CM

## 2019-10-31 NOTE — TELEPHONE ENCOUNTER
Reviewed sleep study results with patient. She expressed understanding and is willing to proceed with a CPAP Titration. Please schedule titration.     Thanks    Eduardo Price,RRT,RPSGT, CSE

## 2019-10-31 NOTE — TELEPHONE ENCOUNTER
Shweta Murdock is to be contacted by lead sleep technologist regarding results of Sleep Testing which was indicative of an average AHI of 5 per hour with an SpO2 hilaria of 90% . * A second polysomnogram has been ordered for mask fitting, PAP desensitizing protocol, and pressure titration. * Follow-up appointment will be scheduled 8-12 weeks following PAP initiation to gauge treatment response and compliance. Encounter Diagnosis   Name Primary?     ANA MARIA (obstructive sleep apnea) Yes       Orders Placed This Encounter    SLEEP LAB (PAP TITRATION)     Standing Status:   Future     Standing Expiration Date:   4/30/2020     Order Specific Question:   Reason for Exam     Answer:   ANA MARIA

## 2019-11-22 ENCOUNTER — TELEPHONE (OUTPATIENT)
Dept: SLEEP MEDICINE | Age: 59
End: 2019-11-22

## 2019-11-22 ENCOUNTER — DOCUMENTATION ONLY (OUTPATIENT)
Dept: SLEEP MEDICINE | Age: 59
End: 2019-11-22

## 2019-11-22 DIAGNOSIS — G47.33 OSA (OBSTRUCTIVE SLEEP APNEA): Primary | ICD-10-CM

## 2019-11-22 NOTE — TELEPHONE ENCOUNTER
Orders Placed This Encounter    AMB SUPPLY ORDER     Primary Encounter Diagnosis: Obstructive Sleep Apnea  (G47.33)    Respironics DreamStation with Heated Humidifer [id number] / [id number]. Positive Airway Pressure Therapy: Duration of need: 99 months. Set Pressure: 8 cmH2O     Nasal Cushion (Replace) 2 per month.  Nasal Interface Mask 1 every 3 months.  Headgear 1 every 6 months.  Filter(s) Disposable 2 per month.  Filter(s) Non-Disposable 1 every 6 months. [address] [facility] for [employer] (Replace) 1 every 6 months.  Tubing with heating element 1 every 3 months. Perform Mask Fitting per patient preference and comfort - replace as above. Gill Skelton MD, [facility]; NPI: [id number]  Electronically signed.  11/22/19

## 2019-11-25 ENCOUNTER — TELEPHONE (OUTPATIENT)
Dept: SLEEP MEDICINE | Age: 59
End: 2019-11-25

## 2019-11-25 NOTE — TELEPHONE ENCOUNTER
PAP device ordered but patient has no insurance. She is interested in the PAP assistance program and will be coming to [facility] to fill out the form needed to obtain. [facility]'s portion of the form is completed and scanned in media. Print for patient to fill out, scan completed form for our records, and give original back to patient for her to send in with her $100 donation. Once received and processed by CPAP Assistance Program, machine will be mailed to [facility] (to assure someone is there to receive it) and we should call her to schedule PAP set up either at [facility] or [facility] (depending on our schedule options for her).  She was provided this information and has been scheduled to complete this form at [facility] tech visit on 12/5/19 @ 9:30am.

## 2020-01-09 PROBLEM — E66.01 MORBID OBESITY (HCC): Status: ACTIVE | Noted: 2020-01-09

## 2020-02-21 PROBLEM — K76.0 FATTY LIVER: Chronic | Status: ACTIVE | Noted: 2020-02-21

## 2020-03-05 ENCOUNTER — OFFICE VISIT (OUTPATIENT)
Dept: SLEEP MEDICINE | Age: 60
End: 2020-03-05

## 2020-03-05 VITALS
TEMPERATURE: 97.2 F | WEIGHT: 212 LBS | DIASTOLIC BLOOD PRESSURE: 77 MMHG | SYSTOLIC BLOOD PRESSURE: 141 MMHG | OXYGEN SATURATION: 98 % | HEART RATE: 77 BPM | HEIGHT: 61 IN | BODY MASS INDEX: 40.02 KG/M2

## 2020-03-05 DIAGNOSIS — F41.9 ANXIETY AND DEPRESSION: ICD-10-CM

## 2020-03-05 DIAGNOSIS — G47.33 OSA (OBSTRUCTIVE SLEEP APNEA): Primary | ICD-10-CM

## 2020-03-05 DIAGNOSIS — F32.A ANXIETY AND DEPRESSION: ICD-10-CM

## 2020-03-05 NOTE — PROGRESS NOTES
[address] [facility] Simone. [place], [address]   Tel.  [phone]   Fax. [address] [facility]   [place], [address]   Tel.  [phone]   Fax. [phone] [address]   Tel.  [phone]   Fax. [phone]       Subjective:   Awilda Bae is a 61 y.o. female seen for a positive airway pressure follow-up, last seen by Dr. Shari العراقي on 8/2/2019, prior notes reviewed in detail. In lab sleep test 10/2019 showed AHI of 5/hr with a lowest SaO2 of 90%. Subsequent titration showed adequate treatment at a pressure of 8 cmH2O. She  is here today for first adherence on PAP device. She has lost 10 pounds. She reports no problems using the device. The following concerns reviewed:    Drowsiness no Problems exhaling no   Snoring no Forget to put on no   Mask Comfortable yes Can't fall asleep no   Dry Mouth no Mask falls off no   Air Leaking no Frequent awakenings no       She admits that her sleep has improved on PAP therapy using nasal pillows mask and non-heated tubing. Review of device download indicated:  Set pressure: 8 cmH2O    95th percentile   % Used Days >= 4 hours: 100. Avg hours used:  8.5. Therapy Apnea Index averaged over PAP use: 3.6 /hr which reflects improved sleep breathing condition. Zahl Sleepiness Score: 0 and Modified F.O.S.Q. Score Total / 2: 7 which reflects improved sleep quality over therapy time. Allergies   Allergen Reactions    Prozac [Fluoxetine] Unknown (comments)     Made depression worse, suicidal thoughts         She has a current medication list which includes the following prescription(s): fluticasone propionate, trazodone, omeprazole, and paroxetine. .      She  has a past medical history of Anxiety, Depression, Esophageal reflux, Insomnia, and [facility] spotted fever) (03/2012). Sleep Review of Systems: notable for Negative difficulty falling asleep;  Positive awakenings at night; Negative early morning headaches; Negative memory problems; Negative concentration issues; Negative chest pain; Negative shortness of breath; Negative significant joint pain at night; Negative significant muscle pain at night; Negative rashes or itching; Negative heartburn; Negative significant mood issues; 0 afternoon naps per week    Objective:     Visit Vitals  /77   Pulse 77   Temp 97.2 °F (36.2 °C)   Ht 5' 1\" (1.549 m)   Wt 212 lb (96.2 kg)   SpO2 98%   BMI 40.06 kg/m²     Neck circ. in \"inches\": 15.5    General:   Alert, oriented, not in acute distress   Eyes:  Anicteric Sclerae; no obvious strabismus   Nose:  No obvious nasal septum deviation    Oropharynx:   Mallampati score 4, thick tongue base, uvula not seen due to low-lying soft palate, narrow tonsilo-pharyngeal pilars   Neck:   Midline trachea   Chest/Lungs:  Symmetrical lung expansion, clear lung fields on auscultation    CVS:  Normal rate, regular rhythm,  no JVD   Extremities:  No obvious rashes, no edema    Neuro:  No focal deficits; No obvious tremor    Psych:  Normal affect,  normal countenance       Assessment:       ICD-10-CM ICD-9-CM    1. ANA MARIA (obstructive sleep apnea) G47.33 327.23    2. Anxiety and depression F41.9 300.00     F32.9 311    3. BMI 40.0-44.9, adult (HCC) Z68.41 V85.41        AHI = 5(10/2019). On CPAP :  8 cmH2O. She is adherent with PAP therapy and PAP continues to benefit patient and remains necessary for control of her sleep apnea. Plan:     Follow-up and Dispositions    · Return in about 1 year (around 3/5/2021). *  Continue on current pressures    * Counseling was provided regarding the importance of regular PAP use with emphasis on ensuring sufficient total sleep time, proper sleep hygiene, and safe driving. * Re-enforced proper and regular cleaning for the device. * She was asked to contact our office for any problems regarding PAP therapy.     2. Anxiety and Depression - continue on her current regimen. 3. Recommended a dedicated weight loss program through appropriate diet and exercise regimen as significant weight reduction has been shown to reduce severity of obstructive sleep apnea. Patient's phone number [phone] (home) was reviewed and confirmed for accuracy  She gives permission for messages regarding results and appointments to be left at that number. PRESTON Patel, [facility]  Electronically signed.  03/05/20

## 2020-03-05 NOTE — PATIENT INSTRUCTIONS
217 [facility] [place]. [place], [address]  Tel.  [phone]  Fax. 100 [facility]  [place], [address] [facility]  Tel.  [phone]  Fax. [phone] [address]  Tel.  [phone]  Fax. [phone]     Learning About CPAP for Sleep Apnea  What is CPAP? CPAP is a small machine that you use at home every night while you sleep. It increases air pressure in your throat to keep your airway open. When you have sleep apnea, this can help you sleep better so you feel much better. CPAP stands for \"continuous positive airway pressure. \"  The CPAP machine will have one of the following:  · A mask that covers your nose and mouth  · Prongs that fit into your nose  · A mask that covers your nose only, the most common type. This type is called NCPAP. The N stands for \"nasal.\"  Why is it done? CPAP is usually the best treatment for obstructive sleep apnea. It is the first treatment choice and the most widely used. Your doctor may suggest CPAP if you have:  · Moderate to severe sleep apnea. · Sleep apnea and coronary artery disease (CAD) or heart failure. How does it help? · CPAP can help you have more normal sleep, so you feel less sleepy and more alert during the daytime. · CPAP may help keep heart failure or other heart problems from getting worse. · NCPAP may help lower your blood pressure. · If you use CPAP, your bed partner may also sleep better because you are not snoring or restless. What are the side effects? Some people who use CPAP have:  · A dry or stuffy nose and a sore throat. · Irritated skin on the face. · Sore eyes. · Bloating. If you have any of these problems, work with your doctor to fix them. Here are some things you can try:  · Be sure the mask or nasal prongs fit well. · See if your doctor can adjust the pressure of your CPAP. · If your nose is dry, try a humidifier.   · If your nose is runny or stuffy, try decongestant medicine or a steroid nasal spray. If these things do not help, you might try a different type of machine. Some machines have air pressure that adjusts on its own. Others have air pressures that are different when you breathe in than when you breathe out. This may reduce discomfort caused by too much pressure in your nose. Where can you learn more? Go to [employer]Brand.be  Enter Theo Murphy in the search box to learn more about \"Learning About CPAP for Sleep Apnea. \"   © 4463-5781 Healthwise, Incorporated. Care instructions adapted under license by [employer] (which disclaims liability or warranty for this information). This care instruction is for use with your licensed healthcare professional. If you have questions about a medical condition or this instruction, always ask your healthcare professional. [address] any warranty or liability for your use of this information. Content Version: 2.7.90532; Last Revised: January 11, 2010  PROPER SLEEP HYGIENE    What to avoid  · Do not have drinks with caffeine, such as coffee or black tea, for 8 hours before bed. · Do not smoke or use other types of tobacco near bedtime. Nicotine is a stimulant and can keep you awake. · Avoid drinking alcohol late in the evening, because it can cause you to wake in the middle of the night. · Do not eat a big meal close to bedtime. If you are hungry, eat a light snack. · Do not drink a lot of water close to bedtime, because the need to urinate may wake you up during the night. · Do not read or watch TV in bed. Use the bed only for sleeping and sexual activity. What to try  · Go to bed at the same time every night, and wake up at the same time every morning. Do not take naps during the day. · Keep your bedroom quiet, dark, and cool. · Get regular exercise, but not within 3 to 4 hours of your bedtime. .  · Sleep on a comfortable pillow and mattress.   · If watching the clock makes you anxious, turn it facing away from you so you cannot see the time. · If you worry when you lie down, start a worry book. Well before bedtime, write down your worries, and then set the book and your concerns aside. · Try meditation or other relaxation techniques before you go to bed. · If you cannot fall asleep, get up and go to another room until you feel sleepy. Do something relaxing. Repeat your bedtime routine before you go to bed again. · Make your house quiet and calm about an hour before bedtime. Turn down the lights, turn off the TV, log off the computer, and turn down the volume on music. This can help you relax after a busy day. Drowsy Driving: The [employer] cites drowsiness as a causing factor in more than 369,010 police reported crashes annually, resulting in 76,000 injuries and 1,500 deaths. Other surveys suggest 55% of people polled have driven while drowsy in the past year, 23% had fallen asleep but not crashed, 3% crashed, and 2% had and accident due to drowsy driving. Who is at risk? Young Drivers: One study of drowsy driving accidents states that 55% of the drivers were under 25 years. Of those, 75% were male. Shift Workers and Travelers: People who work overnight or travel across time zones frequently are at higher risk of experiencing Circadian Rhythm Disorders. They are trying to work and function when their body is programed to sleep. Sleep Deprived: Lack of sleep has a serious impact on your ability to pay attention or focus on a task. Consistently getting less than the average of 8 hours your body needs creates partial or cumulative sleep deprivation. Untreated Sleep Disorders: Sleep Apnea, Narcolepsy, R.L.S., and other sleep disorders (untreated) prevent a person from getting enough restful sleep. This leads to excessive daytime sleepiness and increases the risk for drowsy driving accidents by up to 7 times.   Medications / Alcohol: Even over the counter medications can cause drowsiness. Medications that impair a drivers attention should have a warning label. Alcohol naturally makes you sleepy and on its own can cause accidents. Combined with excessive drowsiness its effects are amplified. Signs of Drowsy Driving:   * You don't remember driving the last few miles   * You may drift out of your paradise   * You are unable to focus and your thoughts wander   * You may yawn more often than normal   * You have difficulty keeping your eyes open / nodding off   * Missing traffic signs, speeding, or tailgating  Prevention-   Good sleep hygiene, lifestyle and behavioral choices have the most impact on drowsy driving. There is no substitute for sleep and the average person requires 8 hours nightly. If you find yourself driving drowsy, stop and sleep. Consider the sleep hygiene tips provided during your visit as well. Medication Refill Policy: Refills for all medications require 1 week advance notice. Please have your pharmacy fax a refill request. We are unable to fax, or call in \"controled substance\" medications and you will need to pick these prescriptions up from our office. Punchd Activation    Thank you for requesting access to Punchd. Please follow the instructions below to securely access and download your online medical record. Punchd allows you to send messages to your doctor, view your test results, renew your prescriptions, schedule appointments, and more. How Do I Sign Up? 1. In your internet browser, go to [email or web address]. 2. Click on the First Time User? Click Here link in the Sign In box. You will see the New Member Sign Up page. 3. Enter your Punchd Access Code exactly as it appears below. You will not need to use this code after youve completed the sign-up process. If you do not sign up before the expiration date, you must request a new code.     Punchd Access Code: [email or web address]  Expires: 3/26/2020 11:00 AM (This is the date your UserEvents access code will )    4. Enter the last four digits of your Social Security Number (xxxx) and Date of Birth (mm/dd/yyyy) as indicated and click Submit. You will be taken to the next sign-up page. 5. Create a UserEvents ID. This will be your UserEvents login ID and cannot be changed, so think of one that is secure and easy to remember. 6. Create a UserEvents password. You can change your password at any time. 7. Enter your Password Reset Question and Answer. This can be used at a later time if you forget your password. 8. Enter your e-mail address. You will receive e-mail notification when new information is available in [address] 9. Click Sign Up. You can now view and download portions of your medical record. 10. Click the Download Summary menu link to download a portable copy of your medical information. Additional Information    If you have questions, please call [phone]. Remember, UserEvents is NOT to be used for urgent needs. For medical emergencies, dial 911.

## 2020-06-08 ENCOUNTER — NURSE TRIAGE (OUTPATIENT)
Dept: OTHER | Facility: CLINIC | Age: 60
End: 2020-06-08

## 2020-06-08 NOTE — TELEPHONE ENCOUNTER
Reason for Disposition   [1] SEVERE pain (e.g., excruciating) AND [2] present > 1 hour    Answer Assessment - Initial Assessment Questions  1. LOCATION: \"Where does it hurt? \"      Below breast, center of stomach  2. RADIATION: \"Does the pain shoot anywhere else? \" (e.g., chest, back)      2 or 3 weeks ago the pain would shoot to back and under breast , but that is gone now  3. ONSET: \"When did the pain begin? \" (e.g., minutes, hours or days ago)       Started back in March 2020 to be constant  4. SUDDEN: \"Gradual or sudden onset? \"      Joselo Ibarra having heartburn for years, but in March it started to be constant and hasn't gone away  5. PATTERN \"Does the pain come and go, or is it constant? \"     - If constant: \"Is it getting better, staying the same, or worsening? \"       (Note: Constant means the pain never goes away completely; most serious pain is constant and it progresses)      - If intermittent: \"How long does it last?\" \"Do you have pain now? \"      (Note: Intermittent means the pain goes away completely between bouts)      \"Constant burning\" per pt  6. SEVERITY: \"How bad is the pain? \"  (e.g., Scale 1-10; mild, moderate, or severe)     - MILD (1-3): doesn't interfere with normal activities, abdomen soft and not tender to touch      - MODERATE (4-7): interferes with normal activities or awakens from sleep, tender to touch      - SEVERE (8-10): excruciating pain, doubled over, unable to do any normal activities        Rates 8/10  7. RECURRENT SYMPTOM: \"Have you ever had this type of abdominal pain before? \" If so, ask: \"When was the last time? \" and \"What happened that time? \"       Has had in the past . Has not been constant like this before  8. AGGRAVATING FACTORS: \"Does anything seem to cause this pain? \" (e.g., foods, stress, alcohol)      Eating makes it worse  9. CARDIAC SYMPTOMS: \"Do you have any of the following symptoms: chest pain, difficulty breathing, sweating, nausea? \"      Sob with lying down - started 5 or 6 days ago, intermittent nausea, sinus drainage. Pt uses cpap at night  10. OTHER SYMPTOMS: \"Do you have any other symptoms? \" (e.g., fever, vomiting, diarrhea)        Vomits 1 to 3 times per week (started 2 weeks ago). Pt has 3 to 4 BMs per day , but it is not liquid  11. PREGNANCY: \"Is there any chance you are pregnant? \" \"When was your last menstrual period? \"        Not asked    Protocols used: ABDOMINAL PAIN - UPPER-ADULT-AH    Pod 7. Pt with symptoms as documented above. Pt informed of disposition, pt will proceed to ER. Care advice as documented. Please do not respond to the triage nurse through this encounter. Any subsequent communication should be directly with the patient.

## 2020-06-09 ENCOUNTER — PATIENT OUTREACH (OUTPATIENT)
Dept: FAMILY MEDICINE CLINIC | Age: 60
End: 2020-06-09

## 2020-06-09 NOTE — PROGRESS NOTES
Patient contacted regarding recent discharge and COVID-19 risk. Discussed COVID-19 related testing which was not done at this time. Test results were not done. Patient informed of results, if available? n/a    Care Transition Nurse/ Ambulatory Care Manager contacted the patient by telephone to perform post discharge assessment. Verified name and  with patient as identifiers. Patient has following risk factors of: FELIPE. CTN/ACM reviewed discharge instructions, medical action plan and red flags related to discharge diagnosis. Reviewed and educated them on any new and changed medications related to discharge diagnosis. Advised obtaining a 90-day supply of all daily and as-needed medications. Education provided regarding infection prevention, and signs and symptoms of COVID-19 and when to seek medical attention with patient who verbalized understanding. Discussed exposure protocols and quarantine from [address] you at higher risk for severe illness  and given an opportunity for questions and concerns. The patient agrees to contact the COVID-19 hotline [phone] or PCP office for questions related to their healthcare. CTN/ACM provided contact information for future reference. From CDC: Are you at higher risk for severe illness?  Wash your hands often.  Avoid close contact (6 feet, which is about two arm lengths) with people who are sick.  Put distance between yourself and other people if COVID-19 is spreading in your community.  Clean and disinfect frequently touched surfaces.  Avoid all cruise travel and non-essential air travel.  Call your healthcare professional if you have concerns about COVID-19 and your underlying condition or if you are sick.     For more information on steps you can take to protect yourself, see CDC's How to Protect Yourself      Patient/family/caregiver given information for Mina Butt and agrees to enroll no  Patient's preferred e-mail: n/a  Patient's preferred phone number: n/a  Based on Loop alert triggers, patient will be contacted by nurse care manager for worsening symptoms. Plan for follow-up call in 7-14 days based on severity of symptoms and risk factors.     Declined PCP follow up, scheduled for EGD

## 2020-06-11 ENCOUNTER — HOSPITAL ENCOUNTER (EMERGENCY)
Age: 60
Discharge: HOME | End: 2020-06-11
Attending: EMERGENCY MEDICINE
Payer: SELF-PAY

## 2020-06-11 VITALS
DIASTOLIC BLOOD PRESSURE: 77 MMHG | RESPIRATION RATE: 18 BRPM | HEIGHT: 61 IN | TEMPERATURE: 98.9 F | OXYGEN SATURATION: 98 % | HEART RATE: 70 BPM | SYSTOLIC BLOOD PRESSURE: 139 MMHG | BODY MASS INDEX: 38.46 KG/M2 | WEIGHT: 203.71 LBS

## 2020-06-11 DIAGNOSIS — R10.13 ABDOMINAL PAIN, EPIGASTRIC: Primary | ICD-10-CM

## 2020-06-11 DIAGNOSIS — E86.0 DEHYDRATION: ICD-10-CM

## 2020-06-11 LAB
ALBUMIN SERPL-MCNC: 3.8 G/DL (ref 3.5–5)
ALBUMIN/GLOB SERPL: 0.8 {RATIO} (ref 1.1–2.2)
ALP SERPL-CCNC: 138 U/L (ref 45–117)
ALT SERPL-CCNC: 24 U/L (ref 12–78)
ANION GAP SERPL CALC-SCNC: 4 MMOL/L (ref 5–15)
APPEARANCE UR: CLEAR
AST SERPL-CCNC: 12 U/L (ref 15–37)
BACTERIA URNS QL MICRO: NEGATIVE /HPF
BASOPHILS # BLD: 0 K/UL (ref 0–0.1)
BASOPHILS NFR BLD: 0 % (ref 0–1)
BILIRUB SERPL-MCNC: 0.4 MG/DL (ref 0.2–1)
BILIRUB UR QL: NEGATIVE
BUN SERPL-MCNC: 6 MG/DL (ref 6–20)
BUN/CREAT SERPL: 7 (ref 12–20)
CALCIUM SERPL-MCNC: 9.3 MG/DL (ref 8.5–10.1)
CHLORIDE SERPL-SCNC: 104 MMOL/L (ref 97–108)
CO2 SERPL-SCNC: 29 MMOL/L (ref 21–32)
COLOR UR: ABNORMAL
CREAT SERPL-MCNC: 0.91 MG/DL (ref 0.55–1.02)
DIFFERENTIAL METHOD BLD: ABNORMAL
EOSINOPHIL # BLD: 0 K/UL (ref 0–0.4)
EOSINOPHIL NFR BLD: 0 % (ref 0–7)
EPITH CASTS URNS QL MICRO: ABNORMAL /LPF
ERYTHROCYTE [DISTWIDTH] IN BLOOD BY AUTOMATED COUNT: 13.1 % (ref 11.5–14.5)
GLOBULIN SER CALC-MCNC: 4.9 G/DL (ref 2–4)
GLUCOSE SERPL-MCNC: 150 MG/DL (ref 65–100)
GLUCOSE UR STRIP.AUTO-MCNC: NEGATIVE MG/DL
HCT VFR BLD AUTO: 39.1 % (ref 35–47)
HGB BLD-MCNC: 12.7 G/DL (ref 11.5–16)
HGB UR QL STRIP: NEGATIVE
HYALINE CASTS URNS QL MICRO: ABNORMAL /LPF (ref 0–5)
IMM GRANULOCYTES # BLD AUTO: 0 K/UL (ref 0–0.04)
IMM GRANULOCYTES NFR BLD AUTO: 1 % (ref 0–0.5)
KETONES UR QL STRIP.AUTO: NEGATIVE MG/DL
LACTATE SERPL-SCNC: 1.5 MMOL/L (ref 0.4–2)
LEUKOCYTE ESTERASE UR QL STRIP.AUTO: ABNORMAL
LIPASE SERPL-CCNC: 94 U/L (ref 73–393)
LYMPHOCYTES # BLD: 1.2 K/UL (ref 0.8–3.5)
LYMPHOCYTES NFR BLD: 20 % (ref 12–49)
MCH RBC QN AUTO: 29.1 PG (ref 26–34)
MCHC RBC AUTO-ENTMCNC: 32.5 G/DL (ref 30–36.5)
MCV RBC AUTO: 89.5 FL (ref 80–99)
MONOCYTES # BLD: 0.3 K/UL (ref 0–1)
MONOCYTES NFR BLD: 5 % (ref 5–13)
NEUTS SEG # BLD: 4.5 K/UL (ref 1.8–8)
NEUTS SEG NFR BLD: 74 % (ref 32–75)
NITRITE UR QL STRIP.AUTO: NEGATIVE
NRBC # BLD: 0 K/UL (ref 0–0.01)
NRBC BLD-RTO: 0 PER 100 WBC
PH UR STRIP: 8.5 [PH] (ref 5–8)
PLATELET # BLD AUTO: 213 K/UL (ref 150–400)
PMV BLD AUTO: 11.5 FL (ref 8.9–12.9)
POTASSIUM SERPL-SCNC: 3.8 MMOL/L (ref 3.5–5.1)
PROT SERPL-MCNC: 8.7 G/DL (ref 6.4–8.2)
PROT UR STRIP-MCNC: NEGATIVE MG/DL
RBC # BLD AUTO: 4.37 M/UL (ref 3.8–5.2)
RBC #/AREA URNS HPF: ABNORMAL /HPF (ref 0–5)
SODIUM SERPL-SCNC: 137 MMOL/L (ref 136–145)
SP GR UR REFRACTOMETRY: 1.01 (ref 1–1.03)
TROPONIN I SERPL-MCNC: <0.05 NG/ML
UROBILINOGEN UR QL STRIP.AUTO: 0.2 EU/DL (ref 0.2–1)
WBC # BLD AUTO: 6 K/UL (ref 3.6–11)
WBC URNS QL MICRO: ABNORMAL /HPF (ref 0–4)

## 2020-06-11 PROCEDURE — 96374 THER/PROPH/DIAG INJ IV PUSH: CPT

## 2020-06-11 PROCEDURE — 81001 URINALYSIS AUTO W/SCOPE: CPT

## 2020-06-11 PROCEDURE — 74011000250 HC RX REV CODE- 250: Performed by: EMERGENCY MEDICINE

## 2020-06-11 PROCEDURE — 74011250637 HC RX REV CODE- 250/637: Performed by: EMERGENCY MEDICINE

## 2020-06-11 PROCEDURE — 36415 COLL VENOUS BLD VENIPUNCTURE: CPT

## 2020-06-11 PROCEDURE — 80053 COMPREHEN METABOLIC PANEL: CPT

## 2020-06-11 PROCEDURE — 83605 ASSAY OF LACTIC ACID: CPT

## 2020-06-11 PROCEDURE — 83690 ASSAY OF LIPASE: CPT

## 2020-06-11 PROCEDURE — 99284 EMERGENCY DEPT VISIT MOD MDM: CPT

## 2020-06-11 PROCEDURE — 96376 TX/PRO/DX INJ SAME DRUG ADON: CPT

## 2020-06-11 PROCEDURE — 74011250636 HC RX REV CODE- 250/636: Performed by: EMERGENCY MEDICINE

## 2020-06-11 PROCEDURE — 96375 TX/PRO/DX INJ NEW DRUG ADDON: CPT

## 2020-06-11 PROCEDURE — 85025 COMPLETE CBC W/AUTO DIFF WBC: CPT

## 2020-06-11 PROCEDURE — 84484 ASSAY OF TROPONIN QUANT: CPT

## 2020-06-11 RX ORDER — ONDANSETRON 2 MG/ML
4 INJECTION INTRAMUSCULAR; INTRAVENOUS
Status: COMPLETED | OUTPATIENT
Start: 2020-06-11 | End: 2020-06-11

## 2020-06-11 RX ORDER — MORPHINE SULFATE 4 MG/ML
4 INJECTION INTRAVENOUS
Status: COMPLETED | OUTPATIENT
Start: 2020-06-11 | End: 2020-06-11

## 2020-06-11 RX ORDER — DICYCLOMINE HYDROCHLORIDE 10 MG/1
20 CAPSULE ORAL 4 TIMES DAILY
Qty: 20 CAP | Refills: 0 | Status: SHIPPED | OUTPATIENT
Start: 2020-06-11 | End: 2020-12-01 | Stop reason: ALTCHOICE

## 2020-06-11 RX ORDER — FENTANYL CITRATE 50 UG/ML
50 INJECTION, SOLUTION INTRAMUSCULAR; INTRAVENOUS
Status: COMPLETED | OUTPATIENT
Start: 2020-06-11 | End: 2020-06-11

## 2020-06-11 RX ADMIN — SODIUM CHLORIDE 1000 ML: 900 INJECTION, SOLUTION INTRAVENOUS at 12:37

## 2020-06-11 RX ADMIN — LIDOCAINE HYDROCHLORIDE 40 ML: 20 SOLUTION ORAL; TOPICAL at 12:43

## 2020-06-11 RX ADMIN — FENTANYL CITRATE 50 MCG: 50 INJECTION INTRAMUSCULAR; INTRAVENOUS at 09:45

## 2020-06-11 RX ADMIN — ONDANSETRON 4 MG: 2 INJECTION INTRAMUSCULAR; INTRAVENOUS at 09:45

## 2020-06-11 RX ADMIN — SODIUM CHLORIDE 20 MG: 9 INJECTION INTRAMUSCULAR; INTRAVENOUS; SUBCUTANEOUS at 09:46

## 2020-06-11 RX ADMIN — MORPHINE SULFATE 4 MG: 4 INJECTION, SOLUTION INTRAMUSCULAR; INTRAVENOUS at 12:40

## 2020-06-11 RX ADMIN — ONDANSETRON 4 MG: 2 INJECTION INTRAMUSCULAR; INTRAVENOUS at 12:39

## 2020-06-11 NOTE — ED PROVIDER NOTES
EMERGENCY DEPARTMENT HISTORY AND PHYSICAL EXAM      Date: 6/11/2020  Patient Name: Edgardo Mata    History of Presenting Illness     Chief Complaint   Patient presents with    Abdominal Pain     Pt reports upper abd pain since March, seen at [facility] last night, can't take the pain anymore. GI appt 7/6/2020. History Provided By: Patient    HPI: Edgardo Mata, 61 y.o. female presents to the ED with cc of epigastric pain. Patient states that her symptoms began 3 months ago. She has had constant epigastric pain which is currently 10 out of 10 in severity. She has an appointment to see GI July 6. She was seen at [facility] on June 8 and yesterday. On June 8, she had a CT scan and lab work performed her results were unremarkable. She states that she had chest pain up until 2 weeks ago which was associated with shortness of breath. She denies any chest pain currently. She also denies cough, fever, chills, dysuria or change in bowel habits. She has nausea, but has not vomited since last week. She has a burning sensation in the esophagus when she swallows food. There are no other complaints, changes, or physical findings at this time.     PCP: Lakia Santana NP    Current Facility-Administered Medications on File Prior to Encounter   Medication Dose Route Frequency Provider Last Rate Last Dose    [COMPLETED] sucralfate (CARAFATE) tablet 1 g  1 g Oral NOW Kristin Sousa MD   1 g at 06/10/20 2220    [COMPLETED] mylanta/viscous lidocaine (GI COCKTAIL)  40 mL Oral Gaurav Villagran MD   40 mL at 06/10/20 2115    [DISCONTINUED] sodium chloride 0.9 % bolus infusion 1,000 mL  1,000 mL IntraVENous Gaurav Villagran MD       [facility] [DISCONTINUED] pantoprazole (PROTONIX) injection 40 mg  40 mg IntraVENous NOW Kristin Sousa MD         Current Outpatient Medications on File Prior to Encounter   Medication Sig Dispense Refill    pantoprazole (Protonix) 40 mg tablet Take 1 Tab by mouth two (2) times a day. 180 Tab 0    sucralfate (Carafate) 1 gram tablet Take 1 Tab by mouth four (4) times daily. 30 Tab 3    ondansetron (Zofran ODT) 4 mg disintegrating tablet Take 1 Tab by mouth every eight (8) hours as needed for Nausea. 10 Tab 0    montelukast (SINGULAIR) 10 mg tablet Take 1 Tab by mouth daily. 30 Tab 0    ipratropium (ATROVENT) 42 mcg (0.06 %) nasal spray 1 [place] by Both Nostrils route four (4) times daily. Indications: runny nose 15 mL 2    cetirizine (ZYRTEC) 10 mg tablet Take 1 Tab by mouth daily. Indications: inflammation of the nose due to an allergy 90 Tab 1    fluticasone propionate (FLONASE) 50 mcg/actuation nasal spray 1 spray in each nostril twice a day 1 Bottle 0    traZODone (DESYREL) 150 mg tablet Take 1 Tab by mouth nightly. 30 Tab 5    PARoxetine (PAXIL) 20 mg tablet Take 0.5 Tabs by mouth daily. 30 Tab 5       Past History     Past Medical History:  Past Medical History:   Diagnosis Date    Anxiety     Depression     Esophageal reflux     Insomnia     [facility]       Past Surgical History:  No past surgical history on file. Family History:  Family History   Problem Relation Age of Onset    Diabetes Sister        Social History:  Social History     Tobacco Use    Smoking status: Never Smoker    Smokeless tobacco: Never Used   Substance Use Topics    Alcohol use: No     Alcohol/week: 0.0 standard drinks    Drug use: No       Allergies: Allergies   Allergen Reactions    Prozac [Fluoxetine] Unknown (comments)     Made depression worse, suicidal thoughts           Review of Systems   Review of Systems   Constitutional: Negative for chills and fever. HENT: Negative for congestion. Eyes: Negative. Respiratory: Negative for cough. Cardiovascular: Negative for leg swelling. Gastrointestinal: Positive for abdominal pain. Endocrine: Negative for heat intolerance. Genitourinary: Negative.     Musculoskeletal: Negative for back pain.   Skin: Negative for rash. Allergic/Immunologic: Negative for immunocompromised state. Neurological: Negative for dizziness. Hematological: Does not bruise/bleed easily. Psychiatric/Behavioral: Negative. All other systems reviewed and are negative. Physical Exam   Physical Exam  Vitals signs and nursing note reviewed. Constitutional:       General: She is not in acute distress. Appearance: She is well-developed. HENT:      Head: Normocephalic. Neck:      Musculoskeletal: Normal range of motion and neck supple. Cardiovascular:      Rate and Rhythm: Normal rate and regular rhythm. Heart sounds: Normal heart sounds. Pulmonary:      Effort: Pulmonary effort is normal.      Breath sounds: Normal breath sounds. Abdominal:      General: Bowel sounds are normal.      Palpations: Abdomen is soft. Tenderness: There is abdominal tenderness in the epigastric area. Musculoskeletal: Normal range of motion. Skin:     General: Skin is warm and dry. Neurological:      General: No focal deficit present. Mental Status: She is alert and oriented to person, place, and time. Psychiatric:         Mood and Affect: Mood normal.         Behavior: Behavior normal.         Diagnostic Study Results     Labs -     Recent Results (from the past 12 hour(s))   CBC WITH AUTOMATED DIFF    Collection Time: 06/11/20  9:28 AM   Result Value Ref Range    WBC 6.0 3.6 - 11.0 K/uL    RBC 4.37 3.80 - 5.20 M/uL    HGB 12.7 11.5 - 16.0 g/dL    HCT 39.1 35.0 - 47.0 %    MCV 89.5 80.0 - 99.0 FL    MCH 29.1 26.0 - 34.0 PG    MCHC 32.5 30.0 - 36.5 g/dL    RDW 13.1 11.5 - 14.5 %    PLATELET 947 751 - 654 K/uL    MPV 11.5 8.9 - 12.9 FL    NRBC 0.0 0  WBC    ABSOLUTE NRBC 0.00 0.00 - 0.01 K/uL    NEUTROPHILS 74 32 - 75 %    LYMPHOCYTES 20 12 - 49 %    MONOCYTES 5 5 - 13 %    EOSINOPHILS 0 0 - 7 %    BASOPHILS 0 0 - 1 %    IMMATURE GRANULOCYTES 1 (H) 0.0 - 0.5 %    ABS.  NEUTROPHILS 4.5 1.8 - 8.0 K/UL ABS. LYMPHOCYTES 1.2 0.8 - 3.5 K/UL    ABS. MONOCYTES 0.3 0.0 - 1.0 K/UL    ABS. EOSINOPHILS 0.0 0.0 - 0.4 K/UL    ABS. BASOPHILS 0.0 0.0 - 0.1 K/UL    ABS. IMM. GRANS. 0.0 0.00 - 0.04 K/UL    DF AUTOMATED     METABOLIC PANEL, COMPREHENSIVE    Collection Time: 06/11/20  9:28 AM   Result Value Ref Range    Sodium 137 136 - 145 mmol/L    Potassium 3.8 3.5 - 5.1 mmol/L    Chloride 104 97 - 108 mmol/L    CO2 29 21 - 32 mmol/L    Anion gap 4 (L) 5 - 15 mmol/L    Glucose 150 (H) 65 - 100 mg/dL    BUN 6 6 - 20 MG/DL    Creatinine 0.91 0.55 - 1.02 MG/DL    BUN/Creatinine ratio 7 (L) 12 - 20      GFR est AA >60 >60 ml/min/1.73m2    GFR est non-AA >60 >60 ml/min/1.73m2    Calcium 9.3 8.5 - 10.1 MG/DL    Bilirubin, total 0.4 0.2 - 1.0 MG/DL    ALT (SGPT) 24 12 - 78 U/L    AST (SGOT) 12 (L) 15 - 37 U/L    Alk.  phosphatase 138 (H) 45 - 117 U/L    Protein, total 8.7 (H) 6.4 - 8.2 g/dL    Albumin 3.8 3.5 - 5.0 g/dL    Globulin 4.9 (H) 2.0 - 4.0 g/dL    A-G Ratio 0.8 (L) 1.1 - 2.2     TROPONIN I    Collection Time: 06/11/20  9:28 AM   Result Value Ref Range    Troponin-I, Qt. <0.05 <0.05 ng/mL   LIPASE    Collection Time: 06/11/20  9:28 AM   Result Value Ref Range    Lipase 94 73 - 393 U/L   LACTIC ACID    Collection Time: 06/11/20  9:28 AM   Result Value Ref Range    Lactic acid 1.5 0.4 - 2.0 MMOL/L   URINALYSIS W/ RFLX MICROSCOPIC    Collection Time: 06/11/20 10:43 AM   Result Value Ref Range    Color YELLOW/STRAW      Appearance CLEAR CLEAR      Specific gravity 1.015 1.003 - 1.030      pH (UA) 8.5 (H) 5.0 - 8.0      Protein Negative NEG mg/dL    Glucose Negative NEG mg/dL    Ketone Negative NEG mg/dL    Bilirubin Negative NEG      Blood Negative NEG      Urobilinogen 0.2 0.2 - 1.0 EU/dL    Nitrites Negative NEG      Leukocyte Esterase TRACE (A) NEG      WBC 0-4 0 - 4 /hpf    RBC 0-5 0 - 5 /hpf    Epithelial cells FEW FEW /lpf    Bacteria Negative NEG /hpf    Hyaline cast 0-2 0 - 5 /lpf       Radiologic Studies -   No orders to display     CT Results  (Last 48 hours)    None        CXR Results  (Last 48 hours)    None          Medical Decision Making   I am the first provider for this patient. I reviewed the vital signs, available nursing notes, past medical history, past surgical history, family history and social history. Vital Signs-Reviewed the patient's vital signs. Patient Vitals for the past 12 hrs:   Temp Pulse Resp BP SpO2   06/11/20 0840 99 °F (37.2 °C) 100 14 175/73 100 %         Records Reviewed: Nursing Notes, Old Medical Records, Previous Radiology Studies and Previous Laboratory Studies    Provider Notes (Medical Decision Making):   Gastritis, reflux, pancreatitis, reflux, CAD    ED Course:   Initial assessment performed. The patients presenting problems have been discussed, and they are in agreement with the care plan formulated and outlined with them. I have encouraged them to ask questions as they arise throughout their visit. Progress note:    Patient states that fentanyl and Pepcid only helped her pain a little bit. Progress note: The patient is feeling better. Her results were reviewed. She is advised to follow-up and return to ER if worse           Critical Care Time:   0    Disposition:  home      DISCHARGE PLAN:  1. Discharge Medication List as of 6/11/2020  2:10 PM      START taking these medications    Details   dicyclomine (BentyL) 10 mg capsule Take 2 Caps by mouth four (4) times daily. , Normal, Disp-20 Cap, R-0         CONTINUE these medications which have NOT CHANGED    Details   pantoprazole (Protonix) 40 mg tablet Take 1 Tab by mouth two (2) times a day., No Print, Disp-180 Tab, R-0      sucralfate (Carafate) 1 gram tablet Take 1 Tab by mouth four (4) times daily. , Normal, Disp-30 Tab, R-3      ondansetron (Zofran ODT) 4 mg disintegrating tablet Take 1 Tab by mouth every eight (8) hours as needed for Nausea., Normal, Disp-10 Tab, R-0      montelukast (SINGULAIR) 10 mg tablet Take 1 Tab by mouth daily. , Normal, Disp-30 Tab, R-0      ipratropium (ATROVENT) 42 mcg (0.06 %) nasal spray 1 [place] by Both Nostrils route four (4) times daily. Indications: runny nose, Normal, Disp-15 mL, R-2      cetirizine (ZYRTEC) 10 mg tablet Take 1 Tab by mouth daily. Indications: inflammation of the nose due to an allergy, No Print, Disp-90 Tab, R-1      fluticasone propionate (FLONASE) 50 mcg/actuation nasal spray 1 spray in each nostril twice a day, Normal, Disp-1 Bottle, R-0      traZODone (DESYREL) 150 mg tablet Take 1 Tab by mouth nightly., Normal, Disp-30 Tab, R-5      PARoxetine (PAXIL) 20 mg tablet Take 0.5 Tabs by mouth daily. , No Print, Disp-30 Tab, R-5           2. Follow-up Information     Follow up With Specialties Details Why Contact Info    Ashleigh Prakash, NP Nurse Practitioner  As needed [address] [facility]  [phone]      Keep your appointment with gastroenterology        [facility] EMERGENCY DEPT Emergency Medicine  If symptoms worsen [address]  [address] [facility]  [phone]        3. Return to ED if worse     Diagnosis     Clinical Impression:   1. Abdominal pain, epigastric    2. Dehydration        Attestations:    iAdan Adame MD    Please note that this dictation was completed with SoundFit, the computer voice recognition software. Quite often unanticipated grammatical, syntax, homophones, and other interpretive errors are inadvertently transcribed by the computer software. Please disregard these errors. Please excuse any errors that have escaped final proofreading. Thank you.

## 2020-06-11 NOTE — DISCHARGE INSTRUCTIONS
Patient Education        Abdominal Pain: Care Instructions  Your Care Instructions     Abdominal pain has many possible causes. Some aren't serious and get better on their own in a few days. Others need more testing and treatment. If your pain continues or gets worse, you need to be rechecked and may need more tests to find out what is wrong. You may need surgery to correct the problem. Don't ignore new symptoms, such as fever, nausea and vomiting, urination problems, pain that gets worse, and dizziness. These may be signs of a more serious problem. Your doctor may have recommended a follow-up visit in the next 8 to 12 hours. If you are not getting better, you may need more tests or treatment. The doctor has checked you carefully, but problems can develop later. If you notice any problems or new symptoms, get medical treatment right away. Follow-up care is a key part of your treatment and safety. Be sure to make and go to all appointments, and call your doctor if you are having problems. It's also a good idea to know your test results and keep a list of the medicines you take. How can you care for yourself at home? · Rest until you feel better. · To prevent dehydration, drink plenty of fluids, enough so that your urine is light yellow or clear like water. Choose water and other caffeine-free clear liquids until you feel better. If you have kidney, heart, or liver disease and have to limit fluids, talk with your doctor before you increase the amount of fluids you drink. · If your stomach is upset, eat mild foods, such as rice, dry toast or crackers, bananas, and applesauce. Try eating several small meals instead of two or three large ones. · Wait until 48 hours after all symptoms have gone away before you have spicy foods, alcohol, and drinks that contain caffeine. · Do not eat foods that are high in fat. · Avoid anti-inflammatory medicines such as aspirin, ibuprofen (Advil, Motrin), and naproxen (Aleve). These can cause stomach upset. Talk to your doctor if you take daily aspirin for another health problem. When should you call for help? [id number] anytime you think you may need emergency care. For example, call if:  · You passed out (lost consciousness). · You pass maroon or very bloody stools. · You vomit blood or what looks like coffee grounds. · You have new, severe belly pain. Call your doctor now or seek immediate medical care if:  · Your pain gets worse, especially if it becomes focused in one area of your belly. · You have a new or higher fever. · Your stools are black and look like tar, or they have streaks of blood. · You have unexpected vaginal bleeding. · You have symptoms of a urinary tract infection. These may include:  ? Pain when you urinate. ? Urinating more often than usual.  ? Blood in your urine. · You are dizzy or lightheaded, or you feel like you may faint. Watch closely for changes in your health, and be sure to contact your doctor if:  · You are not getting better after 1 day (24 hours). Where can you learn more? Go to http://no-rodo.info/  Enter N355 in the search box to learn more about \"Abdominal Pain: Care Instructions. \"  Current as of: June 26, 2019               Content Version: 12.5  © 2978-4925 Healthwise, Incorporated. Care instructions adapted under license by [employer] (which disclaims liability or warranty for this information). If you have questions about a medical condition or this instruction, always ask your healthcare professional. [place] [address] any warranty or liability for your use of this information. Patient Education        Dehydration: Care Instructions  Your Care Instructions  Dehydration happens when your body loses too much fluid. This might happen when you do not drink enough water or you lose large amounts of fluids from your body because of diarrhea, vomiting, or sweating.  Severe dehydration can be life-threatening. Water and minerals called electrolytes help put your body fluids back in balance. Learn the early signs of fluid loss, and drink more fluids to prevent dehydration. Follow-up care is a key part of your treatment and safety. Be sure to make and go to all appointments, and call your doctor if you are having problems. It's also a good idea to know your test results and keep a list of the medicines you take. How can you care for yourself at home? · To prevent dehydration, drink plenty of fluids, enough so that your urine is light yellow or clear like water. Choose water and other caffeine-free clear liquids until you feel better. If you have kidney, heart, or liver disease and have to limit fluids, talk with your doctor before you increase the amount of fluids you drink. · If you do not feel like eating or drinking, try taking small sips of water, sports drinks, or other rehydration drinks. · Get plenty of rest.  To prevent dehydration  · Add more fluids to your diet and daily routine, unless your doctor has told you not to. · During hot weather, drink more fluids. Drink even more fluids if you exercise a lot. Stay away from drinks with alcohol or caffeine. · Watch for the symptoms of dehydration. These include:  ? A dry, sticky mouth. ? Dark yellow urine, and not much of it. ? Dry and sunken eyes. ? Feeling very tired. · Learn what problems can lead to dehydration. These include:  ? Diarrhea, fever, and vomiting. ? Any illness with a fever, such as pneumonia or the flu. ? Activities that cause heavy sweating, such as endurance races and heavy outdoor work in hot or humid weather. ? Alcohol or drug use or problems caused by quitting their use (withdrawal). ? Certain medicines, such as cold and allergy pills (antihistamines), diet pills (diuretics), and laxatives. ? Certain diseases, such as diabetes, cancer, and heart or kidney disease.   When should you call for help?   [id number] anytime you think you may need emergency care. For example, call if:  · You passed out (lost consciousness). Call your doctor now or seek immediate medical care if:  · You are confused and cannot think clearly. · You are dizzy or lightheaded, or you feel like you may faint. · You have signs of needing more fluids. You have sunken eyes and a dry mouth, and you pass only a little dark urine. · You cannot keep fluids down. Watch closely for changes in your health, and be sure to contact your doctor if:  · You are not making tears. · Your skin is very dry and sags slowly back into place after you pinch it. · Your mouth and eyes are very dry. Where can you learn more? Go to http://no-rodo.info/  Enter Q814 in the search box to learn more about \"Dehydration: Care Instructions. \"  Current as of: June 26, 2019               Content Version: 12.5  © 2106-8983 Healthwise, Incorporated. Care instructions adapted under license by [employer] (which disclaims liability or warranty for this information). If you have questions about a medical condition or this instruction, always ask your healthcare professional. [address] any warranty or liability for your use of this information.

## 2020-06-11 NOTE — ED NOTES
Medicated pt per orders. Pt resting on stretcher in POC with call bell in reach. Pt remains on monitor x 2  . VSS at this time.

## 2020-06-23 ENCOUNTER — PATIENT OUTREACH (OUTPATIENT)
Dept: FAMILY MEDICINE CLINIC | Age: 60
End: 2020-06-23

## 2020-06-23 NOTE — PROGRESS NOTES
Patient resolved from Transition of Care episode on 6/23/20  Discussed COVID-19 related testing which was not done at this time. Test results were not done. Patient informed of results, if available? n/a     Patient/family has been provided the following resources and education related to COVID-19:                         Signs, symptoms and red flags related to COVID-19            [facility] exposure and quarantine guidelines            [employer] exposure contact - [phone]            Contact for their local Department of Health                 Patient currently reports that the following symptoms have improved:  burning sensation in stomach. No further outreach scheduled with this CTN/ACM/LPN/HC/ MA. Episode of Care resolved. Patient has this CTN/ACM/LPN/HC/MA contact information if future needs arise.

## 2020-07-07 PROBLEM — K30 DELAYED GASTRIC EMPTYING: Status: ACTIVE | Noted: 2020-07-07

## 2020-07-13 ENCOUNTER — HOSPITAL ENCOUNTER (OUTPATIENT)
Dept: NUCLEAR MEDICINE | Age: 60
Discharge: HOME | End: 2020-07-13
Attending: INTERNAL MEDICINE
Payer: SELF-PAY

## 2020-07-13 DIAGNOSIS — R13.10 DYSPHAGIA: ICD-10-CM

## 2020-07-13 PROCEDURE — 78264 GASTRIC EMPTYING IMG STUDY: CPT

## 2020-07-17 ENCOUNTER — HOSPITAL ENCOUNTER (OUTPATIENT)
Age: 60
Setting detail: OUTPATIENT SURGERY
Discharge: HOME | End: 2020-07-17
Attending: INTERNAL MEDICINE | Admitting: INTERNAL MEDICINE

## 2021-03-15 ENCOUNTER — DOCUMENTATION ONLY (OUTPATIENT)
Dept: SLEEP MEDICINE | Age: 61
End: 2021-03-15

## 2021-03-15 NOTE — PROGRESS NOTES
Called and spoke with patient to confirm virtual visit with Emil Chavez NP on 3/18/2021, appt confirmed. Patient reports wt of 215 lbs and Ht of 5'1, she states her last blood pressure taken was good and last recorded blood pressure was 135/70 on 12/1/2020. Patient reports she is doing well with her CPAP device and uses it consistently, however she still has problems with frequent awakenings constantly throughout the night. She also states because of her not being able to return back to sleep it causes her to eat during the night and has cause her to gain some weight as she reports her previous weight at one point was 205 lbs.  During the ESS/FOSQ questionnaire, she reports she never dozes off at all or become sleepy with any activities but just tired, she also reports a lot of forgetfulness but she states may be from head trauma she has experienced in the past.

## 2021-03-18 ENCOUNTER — VIRTUAL VISIT (OUTPATIENT)
Dept: SLEEP MEDICINE | Age: 61
End: 2021-03-18

## 2021-03-18 ENCOUNTER — DOCUMENTATION ONLY (OUTPATIENT)
Dept: SLEEP MEDICINE | Age: 61
End: 2021-03-18

## 2021-03-18 DIAGNOSIS — F32.A ANXIETY AND DEPRESSION: ICD-10-CM

## 2021-03-18 DIAGNOSIS — G47.33 OSA (OBSTRUCTIVE SLEEP APNEA): Primary | ICD-10-CM

## 2021-03-18 DIAGNOSIS — F41.9 ANXIETY AND DEPRESSION: ICD-10-CM

## 2021-03-18 PROCEDURE — 99442 PR PHYS/QHP TELEPHONE EVALUATION 11-20 MIN: CPT | Performed by: NURSE PRACTITIONER

## 2021-03-18 NOTE — PROGRESS NOTES
Called patient and scheduled 6 month follow up, patient will obtain a copy of her supply order when she brings in CPAP for download per patient is uninsured and she purchases supplies on her own

## 2021-03-18 NOTE — PROGRESS NOTES
217 [facility]., [place]. 101 Brenda Mayes, [address]   Tel.  [phone]   Fax. 100 [facility] 60   [place], [address]   Tel.  [phone]   Fax. [phone] [address]   Tel.  [phone]   Fax. Ed Rutledge 66. (: 1960) is a 61 y.o. female, established patient, seen for positive airway pressure follow-up, she was last seen by me on 3/5/2020, previously seen by Dr. Sandra Villalobos on 2019, prior notes reviewed in detail. In lab sleep test 10/2019 showed AHI of 5/hr with a lowest SaO2 of 90%. Subsequent titration showed adequate treatment at a pressure of 8 cmH2O.     ASSESSMENT/PLAN:    ICD-10-CM ICD-9-CM    1. ANA MARIA (obstructive sleep apnea)  G47.33 327.23 AMB SUPPLY ORDER   2. Anxiety and depression  F41.9 300.00     F32.9 311    3. BMI 40.0-44.9, adult (HCC)  Z68.41 V85.41        AHI = 5(10/2019). On ResMed CPAP :  8 cmH2O. Set up 2020. She is adherent with PAP therapy and PAP continues to benefit patient and remains necessary for control of her sleep apnea. Follow-up and Dispositions    · Return in about 26 weeks (around 2021). 1. Sleep Apnea - Continue on current pressures    *  Supplies ordered - nasal pillows mask and non-heated tubing    Orders Placed This Encounter    AMB SUPPLY ORDER     Diagnosis: (G47.33) ANA MARIA (obstructive sleep apnea)  (primary encounter diagnosis)     Replacement Supplies for Positive Airway Pressure Therapy Device:   Duration of need: 99 months.  Nasal Pillows (Replace) 2 per month.  Nasal Interface Mask 1 every 3 months.  Pos Airway pressure chin strap   Headgear 1 every 6 months.  Tubing with heating element 1 every 3 months.  Filter(s) Disposable 2 per month.  Filter(s) Non-Disposable 1 every 6 months. .   [facility] for Humidifier (Replace) 1 every 6 months.       LEIGH Fitzgerald-BC; NPI: [id number]    Electronically signed. Date:- 03/18/21       * Counseling was provided regarding the importance of regular PAP use with emphasis on ensuring sufficient total sleep time, proper sleep hygiene, and safe driving. * Re-enforced proper and regular cleaning for the device. * She was asked to contact our office for any problems regarding PAP therapy. 2. Anxiety - continue on her current regimen. 3.  Recommended a dedicated weight loss program through appropriate diet and exercise regimen as significant weight reduction has been shown to reduce severity of obstructive sleep apnea. SUBJECTIVE/OBJECTIVE:    She  is seen today for follow up on PAP device and reports no problems using the device. The following concerns identified:    Drowsiness no Problems exhaling no   Snoring no Forget to put on no   Mask Comfortable yes Can't fall asleep no   Dry Mouth no Mask falls off no   Air Leaking no Frequent awakenings no       She admits that her sleep has improved on PAP therapy using nasal pillows mask and non-heated tubing. She notes she is having bad dreams once or twice a week which is an improvement over previously. She has a history of PTSD. Review of device download not available. She will bring device in for download. [place] Sleepiness Score: 0 and Modified F.O.S.Q. Score Total / 2: 12 which reflects improved sleep quality over therapy time. Sleep Review of Systems: notable for Negative difficulty falling asleep; Positive awakenings at night; Negative early morning headaches; Negative memory problems; Negative concentration issues;  Negative chest pain; Negative shortness of breath; Negative significant joint pain at night; Negative significant muscle pain at night; Negative rashes or itching; Negative heartburn; Negative significant mood issues; 0 afternoon naps per week    Vitals reported by patient   Patient-Reported Vitals 3/18/2021   Patient-Reported Weight 215 lbs Patient-Reported Systolic  712   Patient-Reported Diastolic 70      Calculated BMI 40    Physical Exam not completed due to audio only visit. Pursuant to the emergency declaration under the [facility], [address] [place] [address] authority and the [employer] Act, this telephone encounter was conducted with patient's (and/or legal guardian's) consent, to reduce the risk of exposure to COVID-19 and provide necessary medical care. Services were provided through a telephone call to substitute for in-person encounter. I was in the office while conducting this encounter, patient located at their home or alternate location of their choice. Patient identification was verified at the start of the visit: YES using name and date of birth. Patient's phone number [phone] (cell) was confirmed for accuracy. She gives permission for messages regarding results and appointments to be left at that number. On this date 03/18/21 I have spent 15 minutes reviewing previous notes, test results, and on an audio only visit with the patient discussing the diagnosis and importance of compliance with the treatment plan as well as documenting on the day of the visit. An electronic signature was used to authenticate this note.     -- Luisana Tello NP, [facility]  03/18/21

## 2021-03-18 NOTE — PATIENT INSTRUCTIONS
217 [facility]., [place]. 1668 [facility], [address] Tel.  [phone] Fax. 100 [facility] 60 [place], [address] [facility] [address] Tel.  [phone] Fax. [phone] [address] Tel.  [phone] Fax. [phone] Learning About CPAP for Sleep Apnea What is CPAP? CPAP is a small machine that you use at home every night while you sleep. It increases air pressure in your throat to keep your airway open. When you have sleep apnea, this can help you sleep better so you feel much better. CPAP stands for \"continuous positive airway pressure. \" The CPAP machine will have one of the following: · A mask that covers your nose and mouth · Prongs that fit into your nose · A mask that covers your nose only, the most common type. This type is called NCPAP. The N stands for \"nasal.\" Why is it done? CPAP is usually the best treatment for obstructive sleep apnea. It is the first treatment choice and the most widely used. Your doctor may suggest CPAP if you have: · Moderate to severe sleep apnea. · Sleep apnea and coronary artery disease (CAD) or heart failure. How does it help? · CPAP can help you have more normal sleep, so you feel less sleepy and more alert during the daytime. · CPAP may help keep heart failure or other heart problems from getting worse. · NCPAP may help lower your blood pressure. · If you use CPAP, your bed partner may also sleep better because you are not snoring or restless. What are the side effects? Some people who use CPAP have: · A dry or stuffy nose and a sore throat. · Irritated skin on the face. · Sore eyes. · Bloating. If you have any of these problems, work with your doctor to fix them. Here are some things you can try: · Be sure the mask or nasal prongs fit well. · See if your doctor can adjust the pressure of your CPAP. · If your nose is dry, try a humidifier.  
· If your nose is runny or stuffy, try decongestant medicine or a steroid nasal spray. If these things do not help, you might try a different type of machine. Some machines have air pressure that adjusts on its own. Others have air pressures that are different when you breathe in than when you breathe out. This may reduce discomfort caused by too much pressure in your nose. Where can you learn more? Go to avelisbiotech.com.be Enter Y028 in the search box to learn more about \"Learning About CPAP for Sleep Apnea. \"  
© 3018-3205 Healthwise, Incorporated. Care instructions adapted under license by [facility] (which disclaims liability or warranty for this information). This care instruction is for use with your licensed healthcare professional. If you have questions about a medical condition or this instruction, always ask your healthcare professional. [address] any warranty or liability for your use of this information. Content Version: 1.5.53652; Last Revised: January 11, 2010 PROPER SLEEP HYGIENE What to avoid · Do not have drinks with caffeine, such as coffee or black tea, for 8 hours before bed. · Do not smoke or use other types of tobacco near bedtime. Nicotine is a stimulant and can keep you awake. · Avoid drinking alcohol late in the evening, because it can cause you to wake in the middle of the night. · Do not eat a big meal close to bedtime. If you are hungry, eat a light snack. · Do not drink a lot of water close to bedtime, because the need to urinate may wake you up during the night. · Do not read or watch TV in bed. Use the bed only for sleeping and sexual activity. What to try · Go to bed at the same time every night, and wake up at the same time every morning. Do not take naps during the day. · Keep your bedroom quiet, dark, and cool. · Get regular exercise, but not within 3 to 4 hours of your bedtime. Ela Thompson · Sleep on a comfortable pillow and mattress.  
· If watching the clock makes you anxious, turn it facing away from you so you cannot see the time. · If you worry when you lie down, start a worry book. Well before bedtime, write down your worries, and then set the book and your concerns aside. · Try meditation or other relaxation techniques before you go to bed. · If you cannot fall asleep, get up and go to another room until you feel sleepy. Do something relaxing. Repeat your bedtime routine before you go to bed again. · Make your house quiet and calm about an hour before bedtime. Turn down the lights, turn off the TV, log off the computer, and turn down the volume on music. This can help you relax after a busy day. Drowsy Driving: The [employer] cites drowsiness as a causing factor in more than 347,266 police reported crashes annually, resulting in 76,000 injuries and 1,500 deaths. Other surveys suggest 55% of people polled have driven while drowsy in the past year, 23% had fallen asleep but not crashed, 3% crashed, and 2% had and accident due to drowsy driving. Who is at risk? Young Drivers: One study of drowsy driving accidents states that 55% of the drivers were under 25 years. Of those, 75% were male. Shift Workers and Travelers: People who work overnight or travel across time zones frequently are at higher risk of experiencing Circadian Rhythm Disorders. They are trying to work and function when their body is programed to sleep. Sleep Deprived: Lack of sleep has a serious impact on your ability to pay attention or focus on a task. Consistently getting less than the average of 8 hours your body needs creates partial or cumulative sleep deprivation. Untreated Sleep Disorders: Sleep Apnea, Narcolepsy, R.L.S., and other sleep disorders (untreated) prevent a person from getting enough restful sleep. This leads to excessive daytime sleepiness and increases the risk for drowsy driving accidents by up to 7 times.  
Medications / Alcohol: Even over the counter medications can cause drowsiness. Medications that impair a drivers attention should have a warning label. Alcohol naturally makes you sleepy and on its own can cause accidents. Combined with excessive drowsiness its effects are amplified. Signs of Drowsy Driving: * You don't remember driving the last few miles * You may drift out of your paradise * You are unable to focus and your thoughts wander * You may yawn more often than normal 
 * You have difficulty keeping your eyes open / nodding off * Missing traffic signs, speeding, or tailgating Prevention-  
Good sleep hygiene, lifestyle and behavioral choices have the most impact on drowsy driving. There is no substitute for sleep and the average person requires 8 hours nightly. If you find yourself driving drowsy, stop and sleep. Consider the sleep hygiene tips provided during your visit as well. Medication Refill Policy: Refills for all medications require 1 week advance notice. Please have your pharmacy fax a refill request. We are unable to fax, or call in \"controled substance\" medications and you will need to pick these prescriptions up from our office. [employer] Activation Thank you for requesting access to [employer]. Please follow the instructions below to securely access and download your online medical record. [employer] allows you to send messages to your doctor, view your test results, renew your prescriptions, schedule appointments, and more. How Do I Sign Up? 1. In your internet browser, go to [email or web address]. 2. Click on the First Time User? Click Here link in the Sign In box. You will see the New Member Sign Up page. 3. Enter your [employer] Access Code exactly as it appears below. You will not need to use this code after youve completed the sign-up process. If you do not sign up before the expiration date, you must request a new code.  
 
[employer] Access Code: [email or web address] Expires: 2021  3:19 PM (This is the date your [facility] access code will ) 4. Enter the last four digits of your Social Security Number (xxxx) and Date of Birth (mm/dd/yyyy) as indicated and click Submit. You will be taken to the next sign-up page. 5. Create a [facility] ID. This will be your [facility] login ID and cannot be changed, so think of one that is secure and easy to remember. 6. Create a [facility] password. You can change your password at any time. 7. Enter your Password Reset Question and Answer. This can be used at a later time if you forget your password. 8. Enter your e-mail address. You will receive e-mail notification when new information is available in [address] 9. Click Sign Up. You can now view and download portions of your medical record. 10. Click the Download Summary menu link to download a portable copy of your medical information. Additional Information If you have questions, please call [phone]. Remember, [facility] is NOT to be used for urgent needs. For medical emergencies, dial 911.

## 2021-03-22 ENCOUNTER — OFFICE VISIT (OUTPATIENT)
Dept: SLEEP MEDICINE | Age: 61
End: 2021-03-22

## 2021-03-22 DIAGNOSIS — G47.33 OSA (OBSTRUCTIVE SLEEP APNEA): Primary | ICD-10-CM

## 2021-07-15 ENCOUNTER — TELEPHONE (OUTPATIENT)
Dept: FAMILY MEDICINE CLINIC | Age: 61
End: 2021-07-15

## 2021-07-15 NOTE — TELEPHONE ENCOUNTER
Pt requesting refill of Paroxetine 20MG Tab to be sent to [facility].

## 2021-08-02 ENCOUNTER — OFFICE VISIT (OUTPATIENT)
Dept: FAMILY MEDICINE CLINIC | Age: 61
End: 2021-08-02

## 2021-08-02 VITALS
HEIGHT: 61 IN | WEIGHT: 214 LBS | HEART RATE: 90 BPM | TEMPERATURE: 97.5 F | RESPIRATION RATE: 18 BRPM | SYSTOLIC BLOOD PRESSURE: 138 MMHG | OXYGEN SATURATION: 97 % | BODY MASS INDEX: 40.4 KG/M2 | DIASTOLIC BLOOD PRESSURE: 78 MMHG

## 2021-08-02 DIAGNOSIS — K76.0 FATTY LIVER: ICD-10-CM

## 2021-08-02 DIAGNOSIS — M62.838 NECK MUSCLE SPASM: ICD-10-CM

## 2021-08-02 DIAGNOSIS — J30.9 CHRONIC ALLERGIC RHINITIS: ICD-10-CM

## 2021-08-02 DIAGNOSIS — F43.10 PTSD (POST-TRAUMATIC STRESS DISORDER): ICD-10-CM

## 2021-08-02 DIAGNOSIS — K30 DELAYED GASTRIC EMPTYING: ICD-10-CM

## 2021-08-02 DIAGNOSIS — F32.A DEPRESSION, UNSPECIFIED DEPRESSION TYPE: ICD-10-CM

## 2021-08-02 DIAGNOSIS — F41.1 GAD (GENERALIZED ANXIETY DISORDER): Primary | ICD-10-CM

## 2021-08-02 DIAGNOSIS — G47.33 OSA (OBSTRUCTIVE SLEEP APNEA): Chronic | ICD-10-CM

## 2021-08-02 PROCEDURE — 99214 OFFICE O/P EST MOD 30 MIN: CPT | Performed by: NURSE PRACTITIONER

## 2021-08-02 RX ORDER — PAROXETINE HYDROCHLORIDE 20 MG/1
TABLET, FILM COATED ORAL
Qty: 45 TABLET | Refills: 1 | Status: SHIPPED | OUTPATIENT
Start: 2021-08-02 | End: 2022-07-01

## 2021-08-02 RX ORDER — TRAZODONE HYDROCHLORIDE 50 MG/1
50 TABLET ORAL
Qty: 90 TABLET | Refills: 0
Start: 2021-08-02 | End: 2021-12-15 | Stop reason: SDUPTHER

## 2021-08-02 RX ORDER — OMEPRAZOLE 20 MG/1
20 CAPSULE, DELAYED RELEASE ORAL DAILY
COMMUNITY

## 2021-08-02 RX ORDER — CETIRIZINE HCL 10 MG
10 TABLET ORAL DAILY
Qty: 90 TABLET | Refills: 1 | Status: SHIPPED | OUTPATIENT
Start: 2021-08-02 | End: 2022-02-02

## 2021-08-02 RX ORDER — SIMETHICONE 125 MG
125 CAPSULE ORAL
COMMUNITY
End: 2022-02-02

## 2021-08-02 NOTE — PROGRESS NOTES
Subjective:     CC: anxiety    Nichole Hackett is a 61 y.o. female who presents today to follow up for FELIPE and PTSD. She needs a refill on her Paxil. Take half of a 20mg daily. She also takes a third or a quarter of the Trazodone 150mg at bedtime to help her sleep. She takes Flexeril for chronic neck pain as needed. States she needs a new pillow. She is now using CPAP machine for ANA MARIA and has much more energy during the day. Delayed gastric emptying  Pt underwent EGD by Dr Savi Parham recently. Upper and middle esophageal mucosa was normal. There was erythema at the gastroesophageal junction. This was biopsied. There was food in the stomach, despite pt fasting. Erythema found in the antrum- bx was taken. Denies any abd pain or N/V. She takes OTC Gas-Ex as needed. Hx of fatty infiltration of the liver. Abd ultrasound was done 2/2020, no signs of cirrhosis. She is overdue for a lipid panel. Is not taking anything for HLD. Lab Results   Component Value Date/Time    ALT (SGPT) 24 06/11/2020 09:28 AM    AST (SGOT) 12 (L) 06/11/2020 09:28 AM    Alk. phosphatase 138 (H) 06/11/2020 09:28 AM    Bilirubin, total 0.4 06/11/2020 09:28 AM     Lab Results   Component Value Date/Time    Cholesterol, total 241 (H) 07/10/2019 11:53 AM    HDL Cholesterol 55 07/10/2019 11:53 AM    LDL, calculated 158 (H) 07/10/2019 11:53 AM    VLDL, calculated 28 07/10/2019 11:53 AM    Triglyceride 140 07/10/2019 11:53 AM     Allergic rhinnitis  Symptoms well controlled with Zyrtec. Needs refills    Health maintenance  PAP-cannot remember the last one, maybe 10-12 years ago. Advised to have repeated here or at an OBGYN office    Mammo- done in 10/2020- normal  Colonoscopy- never had one, states she will call her GI when she is ready.    PNA vaccines-will begin at age 72  Shingrix-she is interested and will let me know if she would like to pursue  Covid vaccine-she had both Moderna vaccines        Patient Active Problem List   Diagnosis Code    FELIPE (generalized anxiety disorder) F41.1    Trigger finger of left thumb M65.312    Severe obesity (BMI 35.0-39. 9) E66.01    Nonallergic rhinitis J31.0    Chronic allergic rhinitis J30.9    Pure hypercholesterolemia E78.00    Morbid obesity (HCC) E66.01    Fatty liver K76.0    Esophageal reflux K21.9    Delayed gastric emptying K30       Past Medical History:   Diagnosis Date    Anxiety     Depression     Esophageal reflux     Insomnia     [facility] spotted fever) 03/2012         Current Outpatient Medications:     PARoxetine (PAXIL) 20 mg tablet, Take 1/2 (one-half) tablet by mouth once daily, Disp: 15 Tablet, Rfl: 0    cyclobenzaprine (FLEXERIL) 10 mg tablet, Take 1 Tab by mouth three (3) times daily as needed for Muscle Spasm(s). Indications: muscle spasm, Disp: 30 Tab, Rfl: 2    traZODone (DESYREL) 150 mg tablet, Take 1 Tab by mouth nightly., Disp: 30 Tab, Rfl: 5    ipratropium (ATROVENT) 42 mcg (0.06 %) nasal spray, 1 [place] by Both Nostrils route four (4) times daily. Indications: runny nose, Disp: 15 mL, Rfl: 2    cetirizine (ZYRTEC) 10 mg tablet, Take 1 Tab by mouth daily.  Indications: inflammation of the nose due to an allergy, Disp: 90 Tab, Rfl: 1    fluticasone propionate (FLONASE) 50 mcg/actuation nasal spray, 1 spray in each nostril twice a day, Disp: 1 Bottle, Rfl: 0    Allergies   Allergen Reactions    Prozac [Fluoxetine] Unknown (comments)     Made depression worse, suicidal thoughts         Past Surgical History:   Procedure Laterality Date    HX ENDOSCOPY  07/2020    found food in stomach despite fasting       Social History     Tobacco Use   Smoking Status Never Smoker   Smokeless Tobacco Never Used       Social History     Socioeconomic History    Marital status:      Spouse name: Not on file    Number of children: Not on file    Years of education: Not on file    Highest education level: Not on file   Tobacco Use    Smoking status: Never Smoker    Smokeless tobacco: Never Used   Substance and Sexual Activity    Alcohol use: No     Alcohol/week: 0.0 standard drinks    Drug use: No     Social Determinants of Health     Financial Resource Strain:     Difficulty of Paying Living Expenses:    Food Insecurity:     Worried About Running Out of Food in the Last Year:     [address] in the Last Year:    Transportation Needs:     Lack of Transportation (Medical):  Lack of Transportation (Non-Medical):    Physical Activity:     Days of Exercise per Week:     Minutes of Exercise per Session:    Stress:     Feeling of Stress :    Social Connections:     Frequency of Communication with Friends and Family:     Frequency of Social Gatherings with Friends and Family:     Attends Church Services:     Active Member of Clubs or Organizations:     Attends Club or Organization Meetings:     Marital Status:        Family History   Problem Relation Age of Onset    Diabetes Sister     Breast Cancer Sister        ROS:  Gen: denies fever, chills, or fatigue  HEENT:denies H/A, nasal congestion, or sore throat  Resp: denies dyspnea, cough, or wheezing  CV: denies chest pain, pressure, or palpitations  Extremeties: denies edema  GI[de-identified] denies abdominal pain, nausea, vomiting, diarrhea, or constipation  Musculoskeletal: no joint pain, stiffness, or muscle cramps  Neuro: denies numbness/tingling or dizziness  Skin: denies rashes or new lesions   Psych: denies anxiety, depression, ned, or other changes in mood      Objective:     Visit Vitals  /78   Pulse 90   Temp 97.5 °F (36.4 °C) (Temporal)   Resp 18   Ht 5' 1\" (1.549 m)   Wt 214 lb (97.1 kg)   SpO2 97%   BMI 40.43 kg/m²       General: Alert and oriented. No acute distress. Well nourished. HEENT :  Eyes: Sclera white, conjunctiva clear. PERRLA. Extra ocular movements intact. Neck: Supple with FROM. Lungs: Breathing even and unlabored.  All lobes clear to auscultation bilaterally   Heart :RRR, S1 and S2 normal intensity, no extra heart sounds  Extremities: Non-edematous. Neuro: Cranial nerves grossly normal.  Psych: Mood and thought content appropriate for situation. Dressed appropriately and with good hygiene. Skin: Warm, dry, and intact. No lesions or discoloration. Assessment/ Plan:     FELIPE and PTSD  Stable  Cont Paxil 10mg daily  Cont Trazodone 50mg at bedtime prn insomnia. F/U 6 months or sooner prn    Chronic neck pain  Cont Flexeril prn. Plans on buying a new pillow soon. ANA MARIA   Cont CPAP. Delayed gastric emptying  Asymptomatic    Fatty infiltration of the liver  States she had labs drawn at another facility this year  Does not wish to have labs drawn today  Asymptomatic. Allergic rhinnitis  Cont Zyrtec- refilled    F/U 6 months    Health maintenance  PAP-cannot remember the last one, maybe 10-12 years ago. Advised to have repeated here or at an OBGYN office    Mammo- done in 10/2020- normal  Colonoscopy- never had one, states she will call her GI when she is ready. PNA vaccines-will begin at age 72  Shingrix-she is interested and will let me know if she would like to pursue  Covid vaccine-she had both Moderna vaccines        Verbal and written instructions (see AVS) provided.  Patient expresses understanding of diagnosis and treatment plan. Health Maintenance Due   Topic Date Due    PAP AKA CERVICAL CYTOLOGY  Never done    Colorectal Cancer Screening Combo  Never done    Shingrix Vaccine Age 50> (1 of 2) Never done    COVID-19 Vaccine (2 - Moderna 2-dose series) 07/04/2021               Kwesi Fontaine.  Elijah Caballero NP

## 2021-08-02 NOTE — PROGRESS NOTES
1. Have you been to the ER, urgent care clinic since your last visit? Hospitalized since your last visit? No    2. Have you seen or consulted any other health care providers outside of the [address] [place] [address] since your last visit? Include any pap smears or colon screening.  Yes When: 3-22-21 [facility]

## 2021-08-03 PROBLEM — E66.01 MORBID OBESITY (HCC): Status: RESOLVED | Noted: 2020-01-09 | Resolved: 2021-08-03

## 2021-09-16 ENCOUNTER — DOCUMENTATION ONLY (OUTPATIENT)
Dept: SLEEP MEDICINE | Age: 61
End: 2021-09-16

## 2021-09-16 ENCOUNTER — VIRTUAL VISIT (OUTPATIENT)
Dept: SLEEP MEDICINE | Age: 61
End: 2021-09-16

## 2021-09-16 VITALS — BODY MASS INDEX: 41.19 KG/M2 | WEIGHT: 218 LBS

## 2021-09-16 DIAGNOSIS — F32.A ANXIETY AND DEPRESSION: ICD-10-CM

## 2021-09-16 DIAGNOSIS — G47.33 OSA (OBSTRUCTIVE SLEEP APNEA): Primary | ICD-10-CM

## 2021-09-16 DIAGNOSIS — F41.9 ANXIETY AND DEPRESSION: ICD-10-CM

## 2021-09-16 PROCEDURE — 99442 PR PHYS/QHP TELEPHONE EVALUATION 11-20 MIN: CPT | Performed by: NURSE PRACTITIONER

## 2021-09-16 NOTE — PROGRESS NOTES
Spoke with patient who states she is not using a supply company right now and to mail her her supply order. Mailed supply order to patient on 9/16/2021.

## 2021-09-16 NOTE — PATIENT INSTRUCTIONS
217 [facility] Simone. [place], [address]  Tel.  [phone]  Fax. 100 [facility] 60  [place], [address] [facility]  Tel.  [phone]  Fax. [phone] [address]  Tel.  [phone]  Fax. [phone]     Learning About CPAP for Sleep Apnea  What is CPAP? CPAP is a small machine that you use at home every night while you sleep. It increases air pressure in your throat to keep your airway open. When you have sleep apnea, this can help you sleep better so you feel much better. CPAP stands for \"continuous positive airway pressure. \"  The CPAP machine will have one of the following:  · A mask that covers your nose and mouth  · Prongs that fit into your nose  · A mask that covers your nose only, the most common type. This type is called NCPAP. The N stands for \"nasal.\"  Why is it done? CPAP is usually the best treatment for obstructive sleep apnea. It is the first treatment choice and the most widely used. Your doctor may suggest CPAP if you have:  · Moderate to severe sleep apnea. · Sleep apnea and coronary artery disease (CAD) or heart failure. How does it help? · CPAP can help you have more normal sleep, so you feel less sleepy and more alert during the daytime. · CPAP may help keep heart failure or other heart problems from getting worse. · NCPAP may help lower your blood pressure. · If you use CPAP, your bed partner may also sleep better because you are not snoring or restless. What are the side effects? Some people who use CPAP have:  · A dry or stuffy nose and a sore throat. · Irritated skin on the face. · Sore eyes. · Bloating. If you have any of these problems, work with your doctor to fix them. Here are some things you can try:  · Be sure the mask or nasal prongs fit well. · See if your doctor can adjust the pressure of your CPAP. · If your nose is dry, try a humidifier.   · If your nose is runny or stuffy, try decongestant medicine or a steroid nasal spray. If these things do not help, you might try a different type of machine. Some machines have air pressure that adjusts on its own. Others have air pressures that are different when you breathe in than when you breathe out. This may reduce discomfort caused by too much pressure in your nose. Where can you learn more? Go to [employer]be  Enter Krystal Temple in the search box to learn more about \"Learning About CPAP for Sleep Apnea. \"   © 8409-2640 Healthwise, Incorporated. Care instructions adapted under license by [employer] (which disclaims liability or warranty for this information). This care instruction is for use with your licensed healthcare professional. If you have questions about a medical condition or this instruction, always ask your healthcare professional. [address] any warranty or liability for your use of this information. Content Version: 4.7.15881; Last Revised: January 11, 2010  PROPER SLEEP HYGIENE    What to avoid  · Do not have drinks with caffeine, such as coffee or black tea, for 8 hours before bed. · Do not smoke or use other types of tobacco near bedtime. Nicotine is a stimulant and can keep you awake. · Avoid drinking alcohol late in the evening, because it can cause you to wake in the middle of the night. · Do not eat a big meal close to bedtime. If you are hungry, eat a light snack. · Do not drink a lot of water close to bedtime, because the need to urinate may wake you up during the night. · Do not read or watch TV in bed. Use the bed only for sleeping and sexual activity. What to try  · Go to bed at the same time every night, and wake up at the same time every morning. Do not take naps during the day. · Keep your bedroom quiet, dark, and cool. · Get regular exercise, but not within 3 to 4 hours of your bedtime. .  · Sleep on a comfortable pillow and mattress.   · If watching the clock makes you anxious, turn it facing away from you so you cannot see the time. · If you worry when you lie down, start a worry book. Well before bedtime, write down your worries, and then set the book and your concerns aside. · Try meditation or other relaxation techniques before you go to bed. · If you cannot fall asleep, get up and go to another room until you feel sleepy. Do something relaxing. Repeat your bedtime routine before you go to bed again. · Make your house quiet and calm about an hour before bedtime. Turn down the lights, turn off the TV, log off the computer, and turn down the volume on music. This can help you relax after a busy day. Drowsy Driving: The [employer] cites drowsiness as a causing factor in more than 682,217 police reported crashes annually, resulting in 76,000 injuries and 1,500 deaths. Other surveys suggest 55% of people polled have driven while drowsy in the past year, 23% had fallen asleep but not crashed, 3% crashed, and 2% had and accident due to drowsy driving. Who is at risk? Young Drivers: One study of drowsy driving accidents states that 55% of the drivers were under 25 years. Of those, 75% were male. Shift Workers and Travelers: People who work overnight or travel across time zones frequently are at higher risk of experiencing Circadian Rhythm Disorders. They are trying to work and function when their body is programed to sleep. Sleep Deprived: Lack of sleep has a serious impact on your ability to pay attention or focus on a task. Consistently getting less than the average of 8 hours your body needs creates partial or cumulative sleep deprivation. Untreated Sleep Disorders: Sleep Apnea, Narcolepsy, R.L.S., and other sleep disorders (untreated) prevent a person from getting enough restful sleep. This leads to excessive daytime sleepiness and increases the risk for drowsy driving accidents by up to 7 times.   Medications / Alcohol: Even over the counter medications can cause drowsiness. Medications that impair a drivers attention should have a warning label. Alcohol naturally makes you sleepy and on its own can cause accidents. Combined with excessive drowsiness its effects are amplified. Signs of Drowsy Driving:   * You don't remember driving the last few miles   * You may drift out of your paradise   * You are unable to focus and your thoughts wander   * You may yawn more often than normal   * You have difficulty keeping your eyes open / nodding off   * Missing traffic signs, speeding, or tailgating  Prevention-   Good sleep hygiene, lifestyle and behavioral choices have the most impact on drowsy driving. There is no substitute for sleep and the average person requires 8 hours nightly. If you find yourself driving drowsy, stop and sleep. Consider the sleep hygiene tips provided during your visit as well. Medication Refill Policy: Refills for all medications require 1 week advance notice. Please have your pharmacy fax a refill request. We are unable to fax, or call in \"controled substance\" medications and you will need to pick these prescriptions up from our office. [facility] Activation    Thank you for requesting access to [facility]. Please follow the instructions below to securely access and download your online medical record. [facility] allows you to send messages to your doctor, view your test results, renew your prescriptions, schedule appointments, and more. How Do I Sign Up? 1. In your internet browser, go to [email or web address]. 2. Click on the First Time User? Click Here link in the Sign In box. You will see the New Member Sign Up page. 3. Enter your [facility] [employer] Access Code exactly as it appears below. You will not need to use this code after youve completed the sign-up process. If you do not sign up before the expiration date, you must request a new code.     [facility] [employer] Access Code: [email or web address]  Expires: 10/25/2021  5:43 AM (This is the date your [email or web address] access code will )    4. Enter the last four digits of your Social Security Number (xxxx) and Date of Birth (mm/dd/yyyy) as indicated and click Submit. You will be taken to the next sign-up page. 5. Create a [email or web address] ID. This will be your [email or web address] login ID and cannot be changed, so think of one that is secure and easy to remember. 6. Create a [email or web address] password. You can change your password at any time. 7. Enter your Password Reset Question and Answer. This can be used at a later time if you forget your password. 8. Enter your e-mail address. You will receive e-mail notification when new information is available in [address] 9. Click Sign Up. You can now view and download portions of your medical record. 10. Click the Download Summary menu link to download a portable copy of your medical information. Additional Information    If you have questions, please call [phone]. Remember, [email or web address] is NOT to be used for urgent needs. For medical emergencies, dial 911.

## 2021-09-16 NOTE — PROGRESS NOTES
217 [facility]., [place]. [place], [address]   Tel.  [phone]   Fax. 3594 [facility], [address] [facility]   Tel.  [phone]   Fax. [phone] 9250 [facility] [address]   Tel.  [phone]   Fax. Ed Rutledge [address]. (: 1960) is a 64 y.o. female, established patient, seen for positive airway pressure follow-up, she was last seen by me on 3/18/2021, previously seen by Dr. Harjinder Carl on 2019, prior notes reviewed in detail. In lab sleep test 10/2019 showed AHI of 5.0/hr with a lowest SaO2 of 90%. Subsequent titration showed adequate treatment at a pressure of 8 cmH2O.    ASSESSMENT/PLAN:    ICD-10-CM ICD-9-CM    1. ANA MARIA (obstructive sleep apnea)  G47.33 327.23 AMB SUPPLY ORDER   2. Anxiety and depression  F41.9 300.00     F32.9 311    3. BMI 40.0-44.9, adult (HCC)  Z68.41 V85.41        AHI = 5(10/2019). On ResMed CPAP :  8 cmH2O. Set up 2020 through CPAP assistance. She is adherent with PAP therapy and PAP continues to benefit patient and remains necessary for control of her sleep apnea. Follow-up and Dispositions    · Return in about 1 year (around 2022) for annual follow up - [facility] patient. 1. Sleep Apnea - Continue on current pressures. She will bring device in for download     *  Supplies ordered - nasal pillows mask and heated tubing    Orders Placed This Encounter    AMB SUPPLY ORDER     Diagnosis: (G47.33) ANA MARIA (obstructive sleep apnea)  (primary encounter diagnosis)     Replacement Supplies for Positive Airway Pressure Therapy Device:   Duration of need: 99 months.  Nasal Pillows (Replace) 2 per month.  Nasal Interface Mask 1 every 3 months.  Pos Airway pressure chin strap   Headgear 1 every 6 months.  Tubing with heating element 1 every 3 months.  Filter(s) Disposable 2 per month.  Filter(s) Non-Disposable 1 every 6 months.    .    Water Chamber for Subhash Rice (Replace) 1 every 6 months. Alma Guadarrama, AGNP-BC; NPI: [id number]    Electronically signed. Date:- 09/16/21       * Counseling was provided regarding the importance of regular PAP use with emphasis on ensuring sufficient total sleep time, proper sleep hygiene, and safe driving. * Re-enforced proper and regular cleaning for the device. * She was asked to contact our office for any problems regarding PAP therapy. 2. Anxiety and depression- continue on her current regimen. 3. . Recommended a dedicated weight loss program through appropriate diet and exercise regimen as significant weight reduction has been shown to reduce severity of obstructive sleep apnea. She notes that she is not able to exercise due to chronic leg pain. SUBJECTIVE/OBJECTIVE:    She  is seen today for follow up on PAP device and reports no problems using the device. The following concerns reviewed:    Drowsiness no Problems exhaling no   Snoring no Forget to put on no   Mask Comfortable yes Can't fall asleep no   Dry Mouth no Mask falls off no   Air Leaking no Frequent awakenings yes       She admits that her sleep has improved on PAP therapy using nasal pillows mask and non-heated tubing. She notes that she is getting water build up in the tube which is waking her up. She will try to decrease humidity setting and not put as much water in the reservoir. She reports she sleeps much better (still takes 1-2 hours to fall asleep) with the device and is less tired during the day. Review of device download not available - no remote access (CPAP assistance device). Patient will bring device in for download. Byers Sleepiness Score: 8 and Modified F.O.S.Q. Score Total / 2: 19 which reflects improved sleep quality over therapy time. Sleep Review of Systems: notable for Positive difficulty falling asleep;  Positive awakenings at night; Negative early morning headaches; Negative memory problems; Negative concentration issues; Negative chest pain; Negative shortness of breath; Negative significant joint pain at night; Negative significant muscle pain at night; Negative rashes or itching; Negative heartburn; Negative significant mood issues; 0 afternoon naps per week    Vitals reported by patient   Patient-Reported Vitals 9/16/2021   Patient-Reported Weight 218 lbs   Patient-Reported Systolic  -   Patient-Reported Diastolic -      Calculated BMI 41    Physical Exam not completed due to audio only visit. Pursuant to the emergency declaration under the [address] [place] [address] and the [employer] Act, this telephone encounter was conducted with patient's (and/or legal guardian's) consent, to reduce the risk of exposure to COVID-19 and provide necessary medical care. Services were provided through a telephone call to substitute for in-person encounter. I was in the office while conducting this encounter, patient located at their home or alternate location of their choice. Patient identification was verified at the start of the visit: YES using name and date of birth. Patient's phone number  was [phone] (cell) confirmed for accuracy. She gives permission for messages regarding results and appointments to be left at that number. On this date 09/16/21 I have spent 20 minutes reviewing previous notes, test results, and on an audio only visit with the patient discussing the diagnosis and importance of compliance with the treatment plan as well as documenting on the day of the visit. An electronic signature was used to authenticate this note.     -- Jeffry Gottron, NP, [facility]  09/16/21

## 2021-12-15 DIAGNOSIS — F41.1 GAD (GENERALIZED ANXIETY DISORDER): ICD-10-CM

## 2021-12-15 DIAGNOSIS — F43.10 PTSD (POST-TRAUMATIC STRESS DISORDER): ICD-10-CM

## 2021-12-15 RX ORDER — TRAZODONE HYDROCHLORIDE 50 MG/1
50 TABLET ORAL
Qty: 90 TABLET | Refills: 1 | Status: SHIPPED | OUTPATIENT
Start: 2021-12-15 | End: 2022-01-17 | Stop reason: SDUPTHER

## 2022-01-17 ENCOUNTER — VIRTUAL VISIT (OUTPATIENT)
Dept: FAMILY MEDICINE CLINIC | Age: 62
End: 2022-01-17

## 2022-01-17 DIAGNOSIS — J01.00 ACUTE NON-RECURRENT MAXILLARY SINUSITIS: Primary | ICD-10-CM

## 2022-01-17 DIAGNOSIS — F43.10 PTSD (POST-TRAUMATIC STRESS DISORDER): ICD-10-CM

## 2022-01-17 DIAGNOSIS — F41.1 GAD (GENERALIZED ANXIETY DISORDER): ICD-10-CM

## 2022-01-17 PROCEDURE — 99442 PR PHYS/QHP TELEPHONE EVALUATION 11-20 MIN: CPT | Performed by: FAMILY MEDICINE

## 2022-01-17 RX ORDER — AMOXICILLIN AND CLAVULANATE POTASSIUM 875; 125 MG/1; MG/1
1 TABLET, FILM COATED ORAL 2 TIMES DAILY
Qty: 20 TABLET | Refills: 0 | Status: SHIPPED | OUTPATIENT
Start: 2022-01-17 | End: 2022-01-27

## 2022-01-17 RX ORDER — TRAZODONE HYDROCHLORIDE 50 MG/1
50 TABLET ORAL
Qty: 90 TABLET | Refills: 1 | Status: SHIPPED | OUTPATIENT
Start: 2022-01-17 | End: 2022-07-13

## 2022-01-17 NOTE — PROGRESS NOTES
Marilu Perez is a 64 y.o. female, evaluated via audio-only technology on 1/17/2022 for Nasal Congestion (clear mucous    -   [phone] (Phone call only) ) and Cough (productive - clear phelgm )  . Assessment & Plan:   Diagnoses and all orders for this visit:    1. Acute non-recurrent maxillary sinusitis  -     amoxicillin-clavulanate (AUGMENTIN) 875-125 mg per tablet; Take 1 Tablet by mouth two (2) times a day for 10 days. Empiric abx  12  Subjective: Three weeks of URI sxs with post nasal drip and cough. No fevers or SOB. Face pressure. Prior to Admission medications    Medication Sig Start Date End Date Taking? Authorizing Provider   oxymetazoline HCl (MUCINEX SINUS-MAX NA) by Nasal route. Yes Provider, Historical   amoxicillin-clavulanate (AUGMENTIN) 875-125 mg per tablet Take 1 Tablet by mouth two (2) times a day for 10 days. 1/17/22 1/27/22 Yes Cyrus Ribeiro MD   traZODone (DESYREL) 50 mg tablet Take 1 Tablet by mouth nightly. 12/15/21  Yes Ryan YOST NP   omeprazole (PRILOSEC) 20 mg capsule Take 20 mg by mouth daily. Yes Provider, Historical   PARoxetine (PAXIL) 20 mg tablet Take 1/2 (one-half) tablet by mouth once daily 8/2/21  Yes Haris Jeffries NP   simethicone (Gas-X) 125 mg capsule Take 125 mg by mouth four (4) times daily as needed for Flatulence. Patient not taking: Reported on 1/17/2022    Provider, Historical   cetirizine (ZYRTEC) 10 mg tablet Take 1 Tablet by mouth daily. Indications: inflammation of the nose due to an allergy  Patient not taking: Reported on 9/16/2021 8/2/21   Shira Casiano NP   cyclobenzaprine (FLEXERIL) 10 mg tablet Take 1 Tab by mouth three (3) times daily as needed for Muscle Spasm(s). Indications: muscle spasm  Patient not taking: Reported on 9/16/2021 12/1/20   Leatha Lowe MD   ipratropium (ATROVENT) 42 mcg (0.06 %) nasal spray 1 [place] by Both Nostrils route four (4) times daily.  Indications: runny nose  Patient not taking: Reported on 1/17/2022 5/29/20   Comfort Lowe MD   fluticasone propionate [facility]) 50 mcg/actuation nasal spray 1 spray in each nostril twice a day  Patient not taking: Reported on 1/17/2022 1/20/20   Miguel Zavala NP     Patient Active Problem List   Diagnosis Code    FELIPE (generalized anxiety disorder) F41.1    Trigger finger of left thumb M65.312    Severe obesity (BMI 35.0-39. 9) E66.01    Nonallergic rhinitis J31.0    Chronic allergic rhinitis J30.9    Pure hypercholesterolemia E78.00    Fatty liver K76.0    Esophageal reflux K21.9    Delayed gastric emptying K30    ANA MARIA (obstructive sleep apnea) G47.33     Current Outpatient Medications   Medication Sig Dispense Refill    oxymetazoline HCl (MUCINEX SINUS-MAX NA) by Nasal route.  amoxicillin-clavulanate (AUGMENTIN) 875-125 mg per tablet Take 1 Tablet by mouth two (2) times a day for 10 days. 20 Tablet 0    traZODone (DESYREL) 50 mg tablet Take 1 Tablet by mouth nightly. 90 Tablet 1    omeprazole (PRILOSEC) 20 mg capsule Take 20 mg by mouth daily.  PARoxetine (PAXIL) 20 mg tablet Take 1/2 (one-half) tablet by mouth once daily 45 Tablet 1    simethicone (Gas-X) 125 mg capsule Take 125 mg by mouth four (4) times daily as needed for Flatulence. (Patient not taking: Reported on 1/17/2022)      cetirizine (ZYRTEC) 10 mg tablet Take 1 Tablet by mouth daily. Indications: inflammation of the nose due to an allergy (Patient not taking: Reported on 9/16/2021) 90 Tablet 1    cyclobenzaprine (FLEXERIL) 10 mg tablet Take 1 Tab by mouth three (3) times daily as needed for Muscle Spasm(s). Indications: muscle spasm (Patient not taking: Reported on 9/16/2021) 30 Tab 2    ipratropium (ATROVENT) 42 mcg (0.06 %) nasal spray 1 [place] by Both Nostrils route four (4) times daily.  Indications: runny nose (Patient not taking: Reported on 1/17/2022) 15 mL 2    fluticasone propionate (FLONASE) 50 mcg/actuation nasal spray 1 spray in each nostril twice a day (Patient not taking: Reported on 1/17/2022) 1 Bottle 0     Allergies   Allergen Reactions    Prozac [Fluoxetine] Unknown (comments)     Made depression worse, suicidal thoughts         ROS    No data recorded     Shawn Wolfe, who was evaluated through a patient-initiated, synchronous (real-time) audio only encounter, and/or her healthcare decision maker, is aware that it is a billable service, which includes applicable co-pays, with coverage as determined by her insurance carrier. She provided verbal consent to proceed: Yes. She has not had a related appointment within my department in the past 7 days or scheduled within the next 24 hours. The patient was located in a state where the provider was licensed to provide care. On this date 01/17/2022 I have spent 15 minutes reviewing previous notes, test results and face to face (virtual) with the patient discussing the diagnosis and importance of compliance with the treatment plan as well as documenting on the day of the visit. Paris Borrego MD 1. Have you been to the ER, urgent care clinic since your last visit? Hospitalized since your last visit? No    2. Have you seen or consulted any other health care providers outside of the [address] [place] [address] since your last visit? Include any pap smears or colon screening.  No

## 2022-02-02 ENCOUNTER — OFFICE VISIT (OUTPATIENT)
Dept: FAMILY MEDICINE CLINIC | Age: 62
End: 2022-02-02

## 2022-02-02 VITALS
HEART RATE: 101 BPM | RESPIRATION RATE: 18 BRPM | DIASTOLIC BLOOD PRESSURE: 70 MMHG | SYSTOLIC BLOOD PRESSURE: 125 MMHG | WEIGHT: 213.25 LBS | OXYGEN SATURATION: 97 % | BODY MASS INDEX: 40.26 KG/M2 | HEIGHT: 61 IN | TEMPERATURE: 97.8 F

## 2022-02-02 DIAGNOSIS — K76.0 FATTY LIVER: ICD-10-CM

## 2022-02-02 DIAGNOSIS — K21.9 GASTROESOPHAGEAL REFLUX DISEASE WITHOUT ESOPHAGITIS: Primary | ICD-10-CM

## 2022-02-02 DIAGNOSIS — F43.10 PTSD (POST-TRAUMATIC STRESS DISORDER): ICD-10-CM

## 2022-02-02 DIAGNOSIS — F41.1 GAD (GENERALIZED ANXIETY DISORDER): ICD-10-CM

## 2022-02-02 DIAGNOSIS — G47.33 OSA (OBSTRUCTIVE SLEEP APNEA): ICD-10-CM

## 2022-02-02 DIAGNOSIS — Z12.31 ENCOUNTER FOR SCREENING MAMMOGRAM FOR MALIGNANT NEOPLASM OF BREAST: ICD-10-CM

## 2022-02-02 DIAGNOSIS — M65.322 TRIGGER FINGER, LEFT INDEX FINGER: ICD-10-CM

## 2022-02-02 DIAGNOSIS — Z12.11 SCREENING FOR COLON CANCER: ICD-10-CM

## 2022-02-02 DIAGNOSIS — E78.00 PURE HYPERCHOLESTEROLEMIA: ICD-10-CM

## 2022-02-02 PROCEDURE — 99214 OFFICE O/P EST MOD 30 MIN: CPT | Performed by: NURSE PRACTITIONER

## 2022-02-02 NOTE — PROGRESS NOTES
1. Have you been to the ER, urgent care clinic since your last visit? Hospitalized since your last visit? No    2. Have you seen or consulted any other health care providers outside of the [address] [place] [address] since your last visit? Include any pap smears or colon screening.  No     Chief Complaint   Patient presents with    GERD    Arthritis     Visit Vitals  BP (!) 144/79 (BP 1 Location: Left arm, BP Patient Position: Sitting)   Pulse (!) 101   Temp 97.8 °F (36.6 °C) (Temporal)   Resp 18   Ht 5' 1\" (1.549 m)   Wt 213 lb 4 oz (96.7 kg)   SpO2 97%   BMI 40.29 kg/m²

## 2022-02-02 NOTE — PATIENT INSTRUCTIONS
Trigger Finger: Care Instructions  Overview  A trigger finger is a finger stuck in a bent position. It happens when the tendon that bends and straightens the thumb or finger can't slide smoothly under the ligaments that hold the tendon against the bones. In most cases, it's caused by a bump (nodule) that forms on the tendon. The bent finger usually straightens out on its own. A trigger finger can be painful. But it normally isn't a serious problem. Trigger fingers seem to occur more in some groups of people. These groups include:  · People who have diabetes or arthritis. · People who have injured their hands in the past.  · Musicians. · People who  tools often. Rest and exercises may help your finger relax so that it can bend. You may get a corticosteroid shot. This can reduce swelling and pain. Your doctor may put a splint on your finger. It will give your finger some rest. You may need surgery if the finger keeps locking in a bent position. Follow-up care is a key part of your treatment and safety. Be sure to make and go to all appointments, and call your doctor if you are having problems. It's also a good idea to know your test results and keep a list of the medicines you take. How can you care for yourself at home? · If your doctor put a splint on your finger, wear it as directed. Don't take it off until your doctor says you can. · You may need to change your activities to avoid movements that irritate the finger. · Take your medicines exactly as prescribed. Call your doctor if you think you are having a problem with your medicine. · Ask your doctor if you can take an over-the-counter pain medicine, such as acetaminophen (Tylenol), ibuprofen (Advil, Motrin), or naproxen (Aleve). Be safe with medicines. Read and follow all instructions on the label. · If your doctor recommends exercises, do them as directed. When should you call for help?    Call your doctor now or seek immediate medical care if:    · Your finger locks in a bent position and will not straighten. Watch closely for changes in your health, and be sure to contact your doctor if:    · You do not get better as expected. Where can you learn more? Go to http://www.slaughter.com/  Enter M826 in the search box to learn more about \"Trigger Finger: Care Instructions. \"  Current as of: July 1, 2021               Content Version: 13.0  © 2006-2021 [employer]. Care instructions adapted under license by [employer] (which disclaims liability or warranty for this information). If you have questions about a medical condition or this instruction, always ask your healthcare professional. [address] any warranty or liability for your use of this information.

## 2022-02-02 NOTE — PROGRESS NOTES
Subjective:     CC: GERD, arthritis    Ras Shepard is a 64 y.o. female who presents today for a 6 month follow up for GERD and arthritis. GERD  States symptoms are well controlled with Omeprazole 20mg daily. She had an EGD done by Dr Bonilla Purchase 7-2020. There was food in the stomach, despite pt fasting, indicating delayed gastric emptying. She does not take any medication for gastroparesis, however and denies any abd pain, N/V, or bloating. FELIPE and PTSD  She is on Paxil. Takes half of a 20mg daily. She feels good during they day but does not get much sleep at night. Takes Trazodone only 50mg at bedtime to help her sleep. Only sleeps for 4 hours and then she is up for the rest of the night. She is not interested in increasing the dose or trying anything else other than Ambien. I explained to her that my supervising physician does not approve of Ambien and does not want our patients taking it. She does drink tea in the afternoons so I advised her to avoid caffeine after noon. OA   She has trigger finger in her left index finger. It is sore and worse at night. She has been wearing a finger brace and taking advil prn pain. Has had this same problem with her other fingers in the past and they resolved on their own. She is not interested in seeing an orthopedist for injection at this time. ANA MARIA  On CPAP    Hx of fatty infiltration of the liver. Abd ultrasound was done 2/2020, no signs of cirrhosis. She is due for a repeat ultrasound. Denies any abd pain, N/V/D or fatigue. Lab Results   Component Value Date/Time    ALT (SGPT) 24 06/11/2020 09:28 AM    AST (SGOT) 12 (L) 06/11/2020 09:28 AM    Alk. phosphatase 138 (H) 06/11/2020 09:28 AM    Bilirubin, total 0.4 06/11/2020 09:28 AM     HLD  She does not take any meds for HLD. Denies Cp or SOB.     Lab Results   Component Value Date/Time    Cholesterol, total 241 (H) 07/10/2019 11:53 AM    HDL Cholesterol 55 07/10/2019 11:53 AM    LDL, calculated 158 (H) 07/10/2019 11:53 AM    VLDL, calculated 28 07/10/2019 11:53 AM    Triglyceride 140 07/10/2019 11:53 AM     BP was originally high today 144/79 but then came down to 125/70 when rechecked manually. Recently treated for a sinus infection last month and is feeling better now. Health maintenance  PAP-overdue, states she will schedule this soon  Mammo- done in 10/2020- normal, due for repeat, order placed  Colonoscopy- never had one, discussed the Cologuard test and she would prefer to do that instead. No FH of CC or bowel issues. PNA vaccines-will begin at age 72  Shingrix-not addressed today  Covid vaccine-she had both Moderna vaccines. Booster: this is due and she plans to get it soon      Patient Active Problem List   Diagnosis Code    FELIPE (generalized anxiety disorder) F41.1    Trigger finger of left thumb M65.312    Severe obesity (BMI 35.0-39. 9) E66.01    Nonallergic rhinitis J31.0    Chronic allergic rhinitis J30.9    Pure hypercholesterolemia E78.00    Fatty liver K76.0    Esophageal reflux K21.9    Delayed gastric emptying K30    ANA MARIA (obstructive sleep apnea) G47.33       Past Medical History:   Diagnosis Date    Anxiety     Depression     Esophageal reflux     Insomnia     [facility] spotted fever) 03/2012         Current Outpatient Medications:     traZODone (DESYREL) 50 mg tablet, Take 1 Tablet by mouth nightly., Disp: 90 Tablet, Rfl: 1    oxymetazoline HCl (MUCINEX SINUS-MAX NA), by Nasal route., Disp: , Rfl:     omeprazole (PRILOSEC) 20 mg capsule, Take 20 mg by mouth daily. , Disp: , Rfl:     simethicone (Gas-X) 125 mg capsule, Take 125 mg by mouth four (4) times daily as needed for Flatulence. (Patient not taking: Reported on 1/17/2022), Disp: , Rfl:     cetirizine (ZYRTEC) 10 mg tablet, Take 1 Tablet by mouth daily.  Indications: inflammation of the nose due to an allergy (Patient not taking: Reported on 9/16/2021), Disp: 90 Tablet, Rfl: 1    PARoxetine (PAXIL) 20 mg tablet, Take 1/2 (one-half) tablet by mouth once daily, Disp: 45 Tablet, Rfl: 1    cyclobenzaprine (FLEXERIL) 10 mg tablet, Take 1 Tab by mouth three (3) times daily as needed for Muscle Spasm(s). Indications: muscle spasm (Patient not taking: Reported on 9/16/2021), Disp: 30 Tab, Rfl: 2    ipratropium (ATROVENT) 42 mcg (0.06 %) nasal spray, 1 [place] by Both Nostrils route four (4) times daily. Indications: runny nose (Patient not taking: Reported on 1/17/2022), Disp: 15 mL, Rfl: 2    fluticasone propionate (FLONASE) 50 mcg/actuation nasal spray, 1 spray in each nostril twice a day (Patient not taking: Reported on 1/17/2022), Disp: 1 Bottle, Rfl: 0    Allergies   Allergen Reactions    Prozac [Fluoxetine] Unknown (comments)     Made depression worse, suicidal thoughts         Past Surgical History:   Procedure Laterality Date    HX ENDOSCOPY  07/2020    found food in stomach despite fasting       Social History     Tobacco Use   Smoking Status Never Smoker   Smokeless Tobacco Never Used       Social History     Socioeconomic History    Marital status:    Tobacco Use    Smoking status: Never Smoker    Smokeless tobacco: Never Used   Vaping Use    Vaping Use: Never used   Substance and Sexual Activity    Alcohol use: No     Alcohol/week: 0.0 standard drinks    Drug use: No       Family History   Problem Relation Age of Onset    Diabetes Sister     Breast Cancer Sister        ROS:  Gen: denies fever, chills, or fatigue  HEENT:denies H/A, nasal congestion, ear pain, or sore throat  Resp: denies dyspnea, cough, or wheezing  CV: denies chest pain, pressure, or palpitations  Extremeties: denies edema  GI[de-identified] denies dyspepsia, dysphagia, abdominal pain, nausea, vomiting, diarrhea, or constipation  Musculoskeletal: +trigger finger in left index finger.  denies muscle cramps  Neuro: denies numbness/tingling or dizziness  Skin: denies rashes or new lesions   Psych: + anxiety- stable, +insomnia, denies depression       Objective:     Visit Vitals  /70   Pulse (!) 101   Temp 97.8 °F (36.6 °C) (Temporal)   Resp 18   Ht 5' 1\" (1.549 m)   Wt 213 lb 4 oz (96.7 kg)   SpO2 97%   BMI 40.29 kg/m²         General: Alert and oriented. No acute distress. +obese  HEENT :  Eyes: Sclera white, conjunctiva clear. PERRLA. Extra ocular movements intact. Neck: Supple with FROM. Lungs: Breathing even and unlabored. All lobes clear to auscultation bilaterally   Heart :RRR, S1 and S2 normal intensity, no extra heart sounds  Extremities: Non-edematous. Musculoskeletal: +left index finger joint swollen and fixed in flexion. She is able to straighten it with her other hand  Neuro: Cranial nerves grossly normal.  Psych: Mood and thought content appropriate for situation. Dressed appropriately and with good hygiene. Skin: Warm, dry, and intact. No lesions or discoloration. Assessment/ Plan:     GERD  Well controlled  Cont PPI    Trigger finger, left index finger  Cont wearing a finger brace   Cont advil prn pain. Try ice pack  Declines referral to ortho for injection at this time. FELIPE and PTSD  Stable  Cont Paxil 10mg daily    Insomnia  Poorly controlled with Trazodone 50mg at bedtime. She does not want to increase the dose or try anything other than Ambien which I cannot prescribe   Advised to avoid caffeine after noon    ANA MARIA   Cont CPAP. Hx of delayed gastric emptying  Asymptomatic    Fatty infiltration of the liver  Asymptomatic. Check LFTs  Repeat liver ultrasound ordered  Work on low fat diet    HLD  Check CMP, CBC, and lipids  Low fat diet  Go to ER for CP or SOB    Health maintenance  PAP-overdue, states she will schedule this soon  Mammo- done in 10/2020- normal, due for repeat, order placed  Colonoscopy- never had one, discussed the Cologuard test and she would prefer to do that instead. Ordered today. No FH of CC or bowel issues.   PNA vaccines-will begin at age 72  Shingrix-not addressed today  Covid vaccine-she had both Moderna vaccines. Booster: this is due and she plans to get it soon        F/U 6 months or sooner prn        Verbal and written instructions (see AVS) provided.  Patient expresses understanding of diagnosis and treatment plan. Health Maintenance Due   Topic Date Due    Cervical cancer screen  Never done    Colorectal Cancer Screening Combo  Never done    Shingrix Vaccine Age 50> (1 of 2) Never done    Flu Vaccine (1) Never done    Breast Cancer Screen Mammogram  10/10/2021    COVID-19 Vaccine (3 - Booster for Anthony Scrape series) 11/06/2021               Sylvia Jara.  Janell Gutierrez, SANTOSH

## 2022-02-16 ENCOUNTER — LAB ONLY (OUTPATIENT)
Dept: FAMILY MEDICINE CLINIC | Age: 62
End: 2022-02-16

## 2022-02-16 ENCOUNTER — TELEPHONE (OUTPATIENT)
Dept: FAMILY MEDICINE CLINIC | Age: 62
End: 2022-02-16

## 2022-02-16 DIAGNOSIS — K76.0 FATTY LIVER: ICD-10-CM

## 2022-02-16 DIAGNOSIS — E78.00 PURE HYPERCHOLESTEROLEMIA: ICD-10-CM

## 2022-02-16 NOTE — TELEPHONE ENCOUNTER
Pt says she just got over sinus infection about a month ago but she was wondering what should she do she is having the itchy throat again and mucus coming back didn't know if you could send something to [facility]?

## 2022-02-17 LAB
ALBUMIN SERPL-MCNC: 3.8 G/DL (ref 3.5–5)
ALBUMIN/GLOB SERPL: 1 {RATIO} (ref 1.1–2.2)
ALP SERPL-CCNC: 117 U/L (ref 45–117)
ALT SERPL-CCNC: 24 U/L (ref 12–78)
ANION GAP SERPL CALC-SCNC: 5 MMOL/L (ref 5–15)
AST SERPL-CCNC: 12 U/L (ref 15–37)
BILIRUB SERPL-MCNC: 0.4 MG/DL (ref 0.2–1)
BUN SERPL-MCNC: 11 MG/DL (ref 6–20)
BUN/CREAT SERPL: 15 (ref 12–20)
CALCIUM SERPL-MCNC: 9.5 MG/DL (ref 8.5–10.1)
CHLORIDE SERPL-SCNC: 104 MMOL/L (ref 97–108)
CHOLEST SERPL-MCNC: 195 MG/DL
CO2 SERPL-SCNC: 29 MMOL/L (ref 21–32)
CREAT SERPL-MCNC: 0.75 MG/DL (ref 0.55–1.02)
ERYTHROCYTE [DISTWIDTH] IN BLOOD BY AUTOMATED COUNT: 12.8 % (ref 11.5–14.5)
GLOBULIN SER CALC-MCNC: 3.8 G/DL (ref 2–4)
GLUCOSE SERPL-MCNC: 117 MG/DL (ref 65–100)
HCT VFR BLD AUTO: 39.3 % (ref 35–47)
HDLC SERPL-MCNC: 57 MG/DL
HDLC SERPL: 3.4 {RATIO} (ref 0–5)
HGB BLD-MCNC: 12.3 G/DL (ref 11.5–16)
LDLC SERPL CALC-MCNC: 117.4 MG/DL (ref 0–100)
MCH RBC QN AUTO: 28.9 PG (ref 26–34)
MCHC RBC AUTO-ENTMCNC: 31.3 G/DL (ref 30–36.5)
MCV RBC AUTO: 92.3 FL (ref 80–99)
NRBC # BLD: 0 K/UL (ref 0–0.01)
NRBC BLD-RTO: 0 PER 100 WBC
PLATELET # BLD AUTO: 200 K/UL (ref 150–400)
PMV BLD AUTO: 12.4 FL (ref 8.9–12.9)
POTASSIUM SERPL-SCNC: 4.1 MMOL/L (ref 3.5–5.1)
PROT SERPL-MCNC: 7.6 G/DL (ref 6.4–8.2)
RBC # BLD AUTO: 4.26 M/UL (ref 3.8–5.2)
SODIUM SERPL-SCNC: 138 MMOL/L (ref 136–145)
TRIGL SERPL-MCNC: 103 MG/DL (ref ?–150)
VLDLC SERPL CALC-MCNC: 20.6 MG/DL
WBC # BLD AUTO: 3.9 K/UL (ref 3.6–11)

## 2022-02-18 DIAGNOSIS — R73.01 ELEVATED FASTING GLUCOSE: Primary | ICD-10-CM

## 2022-02-18 NOTE — TELEPHONE ENCOUNTER
Patient identified by two patient identifiers, name and date of birth. Spoke with patient. Patient aware and states understanding at this time.

## 2022-02-18 NOTE — PROGRESS NOTES
Cholesterol is just slightly elevated, continue to work on low fat diet by cutting back on fried foods , fast food, desserts, and red meat. Glucose just slightly elevated, can we add on A1C?  Blood count looks good

## 2022-02-20 LAB
EST. AVERAGE GLUCOSE BLD GHB EST-MCNC: 137 MG/DL
HBA1C MFR BLD: 6.4 % (ref 4–5.6)

## 2022-02-21 DIAGNOSIS — R73.03 PREDIABETES: Primary | ICD-10-CM

## 2022-02-21 NOTE — PROGRESS NOTES
Blood sugar is elevated. She is in the pre-diabetes category. Needs to make diet changes to prevent progression of type 2 diabetes. Cut back on carbs (bread, potatos, rice, pasta, cereal) and sugar (desserts, fruit, fruit juices, sodas, and sweet tea).  Please have her recheck A1C in 3 months

## 2022-03-18 PROBLEM — K76.0 FATTY LIVER: Status: ACTIVE | Noted: 2020-02-21

## 2022-03-18 PROBLEM — M65.312 TRIGGER FINGER OF LEFT THUMB: Status: ACTIVE | Noted: 2017-03-21

## 2022-03-19 PROBLEM — G47.33 OSA (OBSTRUCTIVE SLEEP APNEA): Status: ACTIVE | Noted: 2021-08-02

## 2022-03-19 PROBLEM — R73.03 PREDIABETES: Status: ACTIVE | Noted: 2022-02-21

## 2022-03-20 PROBLEM — J31.0 NONALLERGIC RHINITIS: Status: ACTIVE | Noted: 2018-06-22

## 2022-03-20 PROBLEM — K30 DELAYED GASTRIC EMPTYING: Status: ACTIVE | Noted: 2020-07-07

## 2022-03-20 PROBLEM — E78.00 PURE HYPERCHOLESTEROLEMIA: Status: ACTIVE | Noted: 2019-07-15

## 2022-03-20 PROBLEM — J30.9 CHRONIC ALLERGIC RHINITIS: Status: ACTIVE | Noted: 2018-06-22

## 2022-04-25 RX ORDER — MONTELUKAST SODIUM 10 MG/1
10 TABLET ORAL DAILY
Qty: 90 TABLET | Refills: 1 | Status: SHIPPED | OUTPATIENT
Start: 2022-04-25 | End: 2022-08-02

## 2022-04-25 NOTE — TELEPHONE ENCOUNTER
Patient requesting refill on     Requested Prescriptions     Pending Prescriptions Disp Refills    montelukast (SINGULAIR) 10 mg tablet 90 Tablet 1     Sig: Take 1 Tablet by mouth daily.          Last OV 2/2/2022

## 2022-05-06 ENCOUNTER — OFFICE VISIT (OUTPATIENT)
Dept: FAMILY MEDICINE CLINIC | Age: 62
End: 2022-05-06

## 2022-05-06 VITALS
DIASTOLIC BLOOD PRESSURE: 67 MMHG | SYSTOLIC BLOOD PRESSURE: 105 MMHG | TEMPERATURE: 98.1 F | BODY MASS INDEX: 39.74 KG/M2 | HEIGHT: 61 IN | RESPIRATION RATE: 18 BRPM | HEART RATE: 97 BPM | WEIGHT: 210.5 LBS | OXYGEN SATURATION: 97 %

## 2022-05-06 DIAGNOSIS — R05.3 CHRONIC COUGH: Primary | ICD-10-CM

## 2022-05-06 DIAGNOSIS — R06.2 WHEEZING: ICD-10-CM

## 2022-05-06 DIAGNOSIS — K21.9 GASTROESOPHAGEAL REFLUX DISEASE WITHOUT ESOPHAGITIS: ICD-10-CM

## 2022-05-06 PROCEDURE — 99213 OFFICE O/P EST LOW 20 MIN: CPT | Performed by: NURSE PRACTITIONER

## 2022-05-06 RX ORDER — ALBUTEROL SULFATE 90 UG/1
2 AEROSOL, METERED RESPIRATORY (INHALATION)
Qty: 18 G | Refills: 0 | Status: SHIPPED | OUTPATIENT
Start: 2022-05-06

## 2022-05-06 RX ORDER — DOXYCYCLINE 100 MG/1
100 TABLET ORAL 2 TIMES DAILY
Qty: 20 TABLET | Refills: 0 | Status: SHIPPED | OUTPATIENT
Start: 2022-05-06 | End: 2022-05-16

## 2022-05-06 RX ORDER — PREDNISONE 10 MG/1
TABLET ORAL
Qty: 21 TABLET | Refills: 0 | Status: SHIPPED | OUTPATIENT
Start: 2022-05-06 | End: 2022-08-02 | Stop reason: ALTCHOICE

## 2022-05-06 NOTE — PROGRESS NOTES
Subjective:     CC: cough, allergies, GERD    Stephanie Frias is a 64 y.o. female who presents today for a chronic cough x several weeks. States she has \"lots of drainage that is causing a tickle in my throat. \" States the drainage is not coming from her nose, it is in her chest. 3 days ago the mucus was green but now it is clear. She denies any SOB or CP but sometimes wheezes. Denies headaches or sinus pressure or fever or chills. She just started taking some leftover Singulair 10mg daily, it is not helping. Hx of bronchitis and has an  albuterol inhaler with her today. Requesting a refill. She has never smoked but has been exposed to chemicals over the years at her work. GERD  States symptoms are well controlled with Omeprazole 20mg daily. She had an EGD done by Dr Farrar Males . There was food in the stomach, despite pt fasting, indicating delayed gastric emptying. She does not take any medication for gastroparesis, however and denies any abd pain, N/V, or bloating. Patient Active Problem List   Diagnosis Code    FELIPE (generalized anxiety disorder) F41.1    Trigger finger of left thumb M65.312    Severe obesity (BMI 35.0-39. 9) E66.01    Nonallergic rhinitis J31.0    Chronic allergic rhinitis J30.9    Pure hypercholesterolemia E78.00    Fatty liver K76.0    Esophageal reflux K21.9    Delayed gastric emptying K30    ANA MARIA (obstructive sleep apnea) G47.33    Prediabetes R73.03       Past Medical History:   Diagnosis Date    Anxiety     Depression     Esophageal reflux     Insomnia     [facility]         Current Outpatient Medications:     montelukast (SINGULAIR) 10 mg tablet, Take 1 Tablet by mouth daily. , Disp: 90 Tablet, Rfl: 1    traZODone (DESYREL) 50 mg tablet, Take 1 Tablet by mouth nightly., Disp: 90 Tablet, Rfl: 1    omeprazole (PRILOSEC) 20 mg capsule, Take 20 mg by mouth daily. , Disp: , Rfl:     PARoxetine (PAXIL) 20 mg tablet, Take 1/2 (one-half) tablet by mouth once daily, Disp: 45 Tablet, Rfl: 1    Allergies   Allergen Reactions    Prozac [Fluoxetine] Unknown (comments)     Made depression worse, suicidal thoughts         Past Surgical History:   Procedure Laterality Date    HX ENDOSCOPY  07/2020    found food in stomach despite fasting       Social History     Tobacco Use   Smoking Status Never Smoker   Smokeless Tobacco Never Used       Social History     Socioeconomic History    Marital status:    Tobacco Use    Smoking status: Never Smoker    Smokeless tobacco: Never Used   Vaping Use    Vaping Use: Never used   Substance and Sexual Activity    Alcohol use: No     Alcohol/week: 0.0 standard drinks    Drug use: No       Family History   Problem Relation Age of Onset    Diabetes Sister     Breast Cancer Sister        ROS:  Gen: denies fever, chills, or fatigue  HEENT:denies H/A, runny or stuffy nose, ear pain, or sore throat  Resp: denies dyspnea, + cough, chest congestion, and wheezing  CV: denies chest pain, pressure, or palpitations  Extremeties: denies edema  GI[de-identified] denies dyspepsia, dysphagia, abdominal pain, nausea, vomiting, diarrhea, or constipation  Neuro: denies numbness/tingling or dizziness  Skin: denies rashes or new lesions   Psych: + anxiety- stable, denies depression       Objective:     Visit Vitals  /67 (BP 1 Location: Left arm, BP Patient Position: Sitting)   Pulse 97   Temp 98.1 °F (36.7 °C) (Temporal)   Resp 18   Ht 5' 1\" (1.549 m)   Wt 210 lb 8 oz (95.5 kg)   SpO2 97%   BMI 39.77 kg/m²       General: Alert and oriented. No acute distress. +obese  HEENT :  Eyes: Sclera white, conjunctiva clear. PERRLA. Extra ocular movements intact. Nose: patent, no active drainage  Throat: clear, no exudate  Neck: Supple with FROM. Lungs: Breathing even and unlabored.  +rhonchi to bilateral upper lobes Lower lobes are clear to auscultation bilaterally   Heart :RRR, S1 and S2 normal intensity, no extra heart sounds  Extremities: Non-edematous. Neuro: Cranial nerves grossly normal.  Psych: Mood and thought content appropriate for situation. Dressed appropriately and with good hygiene. Skin: Warm, dry, and intact. No lesions or discoloration. Assessment/ Plan:     Chronic cough with wheezing  Suspect asthma or COPD- get PFTs  Start Doxycycline 100mg BID x 10 days  Start prednisone 10mg- take 6 pills today then take 1 less pill every day until gone (#21, 0R)  Start Albuterol inhaler- 2 puffs [id number] prn wheezing  F/U 1 month    GERD  Well controlled  Cont PPI          Verbal and written instructions (see AVS) provided.  Patient expresses understanding of diagnosis and treatment plan. Health Maintenance Due   Topic Date Due    Cervical cancer screen  Never done    Colorectal Cancer Screening Combo  Never done    Shingrix Vaccine Age 50> (1 of 2) Never done    Breast Cancer Screen Mammogram  10/10/2021    COVID-19 Vaccine (3 - Booster for Delman Tiki series) 11/06/2021               Rolando Sober.  Estrella Beckford NP

## 2022-05-06 NOTE — PROGRESS NOTES
1. \"Have you been to the ER, urgent care clinic since your last visit? Hospitalized since your last visit? \" No    2. \"Have you seen or consulted any other health care providers outside of the [address] [place] [address] since your last visit? \" No     3. For patients aged 39-70: Has the patient had a colonoscopy / FIT/ Cologuard? Yes - Care Gap present. Most recent result on file      If the patient is female:    4. For patients aged 41-77: Has the patient had a mammogram within the past 2 years? Yes - Care Gap present. Most recent result on file      5. For patients aged 21-65: Has the patient had a pap smear?  No    Chief Complaint   Patient presents with    Cough    Allergies    GERD     Visit Vitals  /67 (BP 1 Location: Left arm, BP Patient Position: Sitting)   Pulse 97   Temp 98.1 °F (36.7 °C) (Temporal)   Resp 18   Ht 5' 1\" (1.549 m)   Wt 210 lb 8 oz (95.5 kg)   SpO2 97%   BMI 39.77 kg/m²

## 2022-05-17 ENCOUNTER — HOSPITAL ENCOUNTER (OUTPATIENT)
Dept: RESPIRATORY THERAPY | Age: 62
Discharge: HOME | End: 2022-05-17

## 2022-05-17 DIAGNOSIS — R05.3 CHRONIC COUGH: ICD-10-CM

## 2022-05-17 PROCEDURE — 94010 BREATHING CAPACITY TEST: CPT

## 2022-07-12 DIAGNOSIS — F43.10 PTSD (POST-TRAUMATIC STRESS DISORDER): ICD-10-CM

## 2022-07-12 DIAGNOSIS — F41.1 GAD (GENERALIZED ANXIETY DISORDER): ICD-10-CM

## 2022-07-13 RX ORDER — TRAZODONE HYDROCHLORIDE 50 MG/1
50 TABLET ORAL
Qty: 90 TABLET | Refills: 0 | Status: SHIPPED | OUTPATIENT
Start: 2022-07-13 | End: 2022-08-02 | Stop reason: SDUPTHER

## 2022-08-02 ENCOUNTER — OFFICE VISIT (OUTPATIENT)
Dept: FAMILY MEDICINE CLINIC | Age: 62
End: 2022-08-02

## 2022-08-02 VITALS
HEART RATE: 89 BPM | DIASTOLIC BLOOD PRESSURE: 72 MMHG | HEIGHT: 64 IN | RESPIRATION RATE: 18 BRPM | WEIGHT: 207.25 LBS | SYSTOLIC BLOOD PRESSURE: 134 MMHG | BODY MASS INDEX: 35.38 KG/M2 | OXYGEN SATURATION: 97 % | TEMPERATURE: 97.5 F

## 2022-08-02 DIAGNOSIS — R73.03 PREDIABETES: ICD-10-CM

## 2022-08-02 DIAGNOSIS — F33.0 MILD EPISODE OF RECURRENT MAJOR DEPRESSIVE DISORDER (HCC): ICD-10-CM

## 2022-08-02 DIAGNOSIS — K76.0 FATTY LIVER: ICD-10-CM

## 2022-08-02 DIAGNOSIS — F32.A DEPRESSION, UNSPECIFIED DEPRESSION TYPE: ICD-10-CM

## 2022-08-02 DIAGNOSIS — G47.33 OSA (OBSTRUCTIVE SLEEP APNEA): ICD-10-CM

## 2022-08-02 DIAGNOSIS — K21.9 GASTROESOPHAGEAL REFLUX DISEASE WITHOUT ESOPHAGITIS: ICD-10-CM

## 2022-08-02 DIAGNOSIS — E11.9 WELL CONTROLLED TYPE 2 DIABETES MELLITUS (HCC): ICD-10-CM

## 2022-08-02 DIAGNOSIS — F43.10 PTSD (POST-TRAUMATIC STRESS DISORDER): ICD-10-CM

## 2022-08-02 DIAGNOSIS — F41.1 GAD (GENERALIZED ANXIETY DISORDER): Primary | ICD-10-CM

## 2022-08-02 PROCEDURE — 99213 OFFICE O/P EST LOW 20 MIN: CPT | Performed by: NURSE PRACTITIONER

## 2022-08-02 PROCEDURE — 36415 COLL VENOUS BLD VENIPUNCTURE: CPT | Performed by: NURSE PRACTITIONER

## 2022-08-02 RX ORDER — TRAZODONE HYDROCHLORIDE 50 MG/1
75 TABLET ORAL
Qty: 120 TABLET | Refills: 3
Start: 2022-08-02 | End: 2022-09-22 | Stop reason: SDUPTHER

## 2022-08-02 RX ORDER — PAROXETINE HYDROCHLORIDE 20 MG/1
TABLET, FILM COATED ORAL
Qty: 90 TABLET | Refills: 3
Start: 2022-08-02

## 2022-08-03 LAB
EST. AVERAGE GLUCOSE BLD GHB EST-MCNC: 146 MG/DL
HBA1C MFR BLD: 6.7 % (ref 4–5.6)

## 2022-08-03 RX ORDER — METFORMIN HYDROCHLORIDE 500 MG/1
500 TABLET ORAL
Qty: 90 TABLET | Refills: 0 | Status: SHIPPED | OUTPATIENT
Start: 2022-08-03

## 2022-08-03 NOTE — PROGRESS NOTES
Blood sugar is higher, she now has type 2 diabetes. I would recommend starting Metformin 500mg daily to help lower blood sugar and also Work on diet. Cut back on carbs (bread, potatos, rice, pasta, cereal) and sugar (desserts, fruit, fruit juices, sodas, and sweet tea).  Recheck A1C in 3 months

## 2022-08-03 NOTE — PROGRESS NOTES
Patient identified by two patient identifiers, name and date of birth. Spoke with patient. Patient aware of results and states understanding at this time. Patient will take metformin and rx sent to [facility] per Chetan Fontana NP's VORB (metformin 500mg 1 tab PO every morning with breakfast # 90 with 0 refills. )

## 2022-09-01 ENCOUNTER — OFFICE VISIT (OUTPATIENT)
Dept: SLEEP MEDICINE | Age: 62
End: 2022-09-01

## 2022-09-01 VITALS
OXYGEN SATURATION: 94 % | HEIGHT: 61 IN | HEART RATE: 80 BPM | WEIGHT: 200 LBS | DIASTOLIC BLOOD PRESSURE: 70 MMHG | SYSTOLIC BLOOD PRESSURE: 133 MMHG | BODY MASS INDEX: 37.76 KG/M2

## 2022-09-01 DIAGNOSIS — F41.9 ANXIETY AND DEPRESSION: ICD-10-CM

## 2022-09-01 DIAGNOSIS — F32.A ANXIETY AND DEPRESSION: ICD-10-CM

## 2022-09-01 DIAGNOSIS — G47.33 OSA (OBSTRUCTIVE SLEEP APNEA): Primary | ICD-10-CM

## 2022-09-01 PROCEDURE — 99213 OFFICE O/P EST LOW 20 MIN: CPT | Performed by: NURSE PRACTITIONER

## 2022-09-01 NOTE — PROGRESS NOTES
[address] [facility] [place]. [place], [address]   Tel.  [phone]   Fax. [facility] [address]   [place], [address] [facility]   Tel.  [phone]   Fax. [phone]  [facility] [address]   Tel.  [phone]   Fax. Ed Rutledge 66. (: 1960) is a 64 y.o. female, established patient, seen for positive airway pressure follow-up and evaluation. She was last seen by me on 2021, previously seen by Dr. Shahid Thibodeaux on 2019, prior notes and sleep testing reviewed in detail. In lab sleep test 10/2019 showed AHI of 5.0/hr with a lowest SaO2 of 90%. Subsequent titration showed adequate treatment at a pressure of 8 cmH2O.    ASSESSMENT/PLAN:    ICD-10-CM ICD-9-CM    1. ANA MARIA (obstructive sleep apnea)  G47.33 327.23 AMB SUPPLY ORDER      AMB SUPPLY ORDER      2. Anxiety and depression  F41.9 300.00     F32. A 311       3. Adult BMI 37.0-37.9 kg/sq m  Z68.37 V85.37           AHI = 5(10/2019). On ResMed CPAP :  8 cmH2O. Set up 2020 through CPAP assistance. She is adherent with PAP therapy and PAP continues to benefit patient and remains necessary for control of her sleep apnea. Follow-up and Dispositions    Return in about 1 year (around 2023) for annual follow up . Sleep Apnea -  Sleep apnea condition has been exacerbated and treatment is causing negative side effects. Change in treatment plan is needed. Change current pressure settings to  CPAP 9, turn ramp off. Changes made manual on device by sleep tech. * Supplies ordered - nasal pillows mask and heated tubing    Orders Placed This Encounter    AMB SUPPLY ORDER     Diagnosis: (G47.33) ANA MARIA (obstructive sleep apnea)  (primary encounter diagnosis)     Pressure change CPAP to 9 cmH2O. Ramp off    Changes made in sleep lab. PRESTON Mix; NPI: [id number]    Electronically signed.  Date:- 22    AMB SUPPLY ORDER     Diagnosis: (G47.33) ANA MARIA (obstructive sleep apnea)  (primary encounter diagnosis)     Replacement Supplies for Positive Airway Pressure Therapy Device:   Duration of need: 99 months.  Nasal Pillows (Replace) 2 per month.  Nasal Interface Mask 1 every 3 months.  Pos Airway pressure chin strap   Headgear 1 every 6 months.   Tubing with non-heating element 1 every 3 months.  Filter(s) Disposable 2 per month.  Filter(s) Non-Disposable 1 every 6 months. .   [address] for Humidifier (Replace) 1 every 6 months. LEIGH [place]-BC; NPI: [id number]    Electronically signed. Date:- 09/01/22     * Counseling was provided regarding the importance of regular PAP use with emphasis on ensuring sufficient total sleep time, proper sleep hygiene, and safe driving. * Re-enforced proper and regular cleaning for the device. * She was asked to contact our office for any problems regarding PAP therapy. 2. Anxiety and depression- continue on her current regimen. 3. Encouraged continued weight management program through appropriate diet and exercise regimen as significant weight reduction has been shown to reduce severity of obstructive sleep apnea. She has lost approximately 20 pounds since prior visit. SUBJECTIVE/OBJECTIVE:    She  is seen today for follow up on PAP device and reports some problems using the device. The following concerns identified:    Drowsiness no Problems exhaling no   Snoring no Forget to put on no   Mask Comfortable yes Can't fall asleep no   Dry Mouth no Mask falls off no   Air Leaking no Frequent awakenings yes       She admits that her sleep has improved on PAP therapy using nasal pillows mask and heated tubing. She reports feeling tired during the day but not sleepy. She is not able to fall asleep if she tries to lay down. She does wake up frequently at night, sometimes just 1 hour after falling asleep.   She is falling asleep in 1 hour or less which is an improvement from previously. We discussed decreasing amount of Tea she drinks and limiting to only water after 5pm to help with less caffeine. She is taking Trazodone at bedtime and will trial taking it 30 minutes before bedtime. She notes she feels that the pressure is not enough at times, we will turn off the ramp and increase the device pressure slightly. Review of device download indicated:  Set pressure: 8 cmH2O;   95th Percentile Leak: 0.2 L/Min     % Used Days >= 4 hours: 100. Avg hours used:  9:49. Therapy Apnea Index averaged over PAP use: 2.9 /hr which reflects improved sleep breathing condition. Ennice Sleepiness Score: 0 and Modified F.O.S.Q. Score Total / 2: 11.5   Sleep Review of Systems: notable for Negative difficulty falling asleep; Positive awakenings at night; Negative early morning headaches; Negative memory problems; Negative concentration issues; Negative chest pain; Negative shortness of breath; Negative significant joint pain at night; Negative significant muscle pain at night; Negative rashes or itching; Negative heartburn; Negative significant mood issues; 0 afternoon naps per week      Visit Vitals  /70 (BP 1 Location: Left arm, BP Patient Position: Sitting)   Pulse 80   Ht 5' 1\" (1.549 m)   Wt 200 lb (90.7 kg)   SpO2 94%   BMI 37.79 kg/m²          General:   Alert, oriented, not in acute distress   Eyes:  Anicteric Sclerae; no obvious strabismus   Nose:  No obvious nasal septum deviation    Neck:   Midline trachea   Chest/Lungs:  Symmetrical lung expansion, clear lung fields on auscultation    CVS:  Normal rate, regular rhythm,  no JVD   Extremities:  No obvious rashes, no edema    Neuro:  No focal deficits; No obvious tremor    Psych:  Normal affect,  normal countenance     Patient's phone number [phone] (cell) was reviewed and confirmed for accuracy.   She gives permission for messages regarding results and appointments to be left at that number. An electronic signature was used to authenticate this note.     -- Vasile Tinajero NP, [facility]  09/01/22

## 2022-09-01 NOTE — PATIENT INSTRUCTIONS
[address] [facility] [place]. [place], [address]  Tel.  [phone]  Fax. 100 [facility] 60  [place], [address] [facility]  Tel.  [phone]  Fax. [phone] [address]  Tel.  [phone]  Fax. [phone]     Learning About CPAP for Sleep Apnea  What is CPAP? CPAP is a small machine that you use at home every night while you sleep. It increases air pressure in your throat to keep your airway open. When you have sleep apnea, this can help you sleep better so you feel much better. CPAP stands for \"continuous positive airway pressure. \"  The CPAP machine will have one of the following:  A mask that covers your nose and mouth  Prongs that fit into your nose  A mask that covers your nose only, the most common type. This type is called NCPAP. The N stands for \"nasal.\"  Why is it done? CPAP is usually the best treatment for obstructive sleep apnea. It is the first treatment choice and the most widely used. Your doctor may suggest CPAP if you have: Moderate to severe sleep apnea. Sleep apnea and coronary artery disease (CAD) or heart failure. How does it help? CPAP can help you have more normal sleep, so you feel less sleepy and more alert during the daytime. CPAP may help keep heart failure or other heart problems from getting worse. NCPAP may help lower your blood pressure. If you use CPAP, your bed partner may also sleep better because you are not snoring or restless. What are the side effects? Some people who use CPAP have:  A dry or stuffy nose and a sore throat. Irritated skin on the face. Sore eyes. Bloating. If you have any of these problems, work with your doctor to fix them. Here are some things you can try:  Be sure the mask or nasal prongs fit well. See if your doctor can adjust the pressure of your CPAP. If your nose is dry, try a humidifier.   If your nose is runny or stuffy, try decongestant medicine or a steroid nasal spray. If these things do not help, you might try a different type of machine. Some machines have air pressure that adjusts on its own. Others have air pressures that are different when you breathe in than when you breathe out. This may reduce discomfort caused by too much pressure in your nose. Where can you learn more? Go to Savored[employer]be  Enter Kelley Desir in the search box to learn more about \"Learning About CPAP for Sleep Apnea. \"   © 1673-3337 Healthwise, Incorporated. Care instructions adapted under license by [address] [place] [address] [employer] (which disclaims liability or warranty for this information). This care instruction is for use with your licensed healthcare professional. If you have questions about a medical condition or this instruction, always ask your healthcare professional. [address] any warranty or liability for your use of this information. Content Version: 5.4.56271; Last Revised: January 11, 2010  PROPER SLEEP HYGIENE    What to avoid  Do not have drinks with caffeine, such as coffee or black tea, for 8 hours before bed. Do not smoke or use other types of tobacco near bedtime. Nicotine is a stimulant and can keep you awake. Avoid drinking alcohol late in the evening, because it can cause you to wake in the middle of the night. Do not eat a big meal close to bedtime. If you are hungry, eat a light snack. Do not drink a lot of water close to bedtime, because the need to urinate may wake you up during the night. Do not read or watch TV in bed. Use the bed only for sleeping and sexual activity. What to try  Go to bed at the same time every night, and wake up at the same time every morning. Do not take naps during the day. Keep your bedroom quiet, dark, and cool. Get regular exercise, but not within 3 to 4 hours of your bedtime. .  Sleep on a comfortable pillow and mattress.   If watching the clock makes you anxious, turn it facing away from you so you cannot see the time. If you worry when you lie down, start a worry book. Well before bedtime, write down your worries, and then set the book and your concerns aside. Try meditation or other relaxation techniques before you go to bed. If you cannot fall asleep, get up and go to another room until you feel sleepy. Do something relaxing. Repeat your bedtime routine before you go to bed again. Make your house quiet and calm about an hour before bedtime. Turn down the lights, turn off the TV, log off the computer, and turn down the volume on music. This can help you relax after a busy day. Drowsy Driving: The [place] [address] cites drowsiness as a causing factor in more than 308,687 police reported crashes annually, resulting in 76,000 injuries and 1,500 deaths. Other surveys suggest 55% of people polled have driven while drowsy in the past year, 23% had fallen asleep but not crashed, 3% crashed, and 2% had and accident due to drowsy driving. Who is at risk? Young Drivers: One study of drowsy driving accidents states that 55% of the drivers were under 25 years. Of those, 75% were male. Shift Workers and Travelers: People who work overnight or travel across time zones frequently are at higher risk of experiencing Circadian Rhythm Disorders. They are trying to work and function when their body is programed to sleep. Sleep Deprived: Lack of sleep has a serious impact on your ability to pay attention or focus on a task. Consistently getting less than the average of 8 hours your body needs creates partial or cumulative sleep deprivation. Untreated Sleep Disorders: Sleep Apnea, Narcolepsy, R.L.S., and other sleep disorders (untreated) prevent a person from getting enough restful sleep. This leads to excessive daytime sleepiness and increases the risk for drowsy driving accidents by up to 7 times.   Medications / Alcohol: Even over the counter medications can cause drowsiness. Medications that impair a drivers attention should have a warning label. Alcohol naturally makes you sleepy and on its own can cause accidents. Combined with excessive drowsiness its effects are amplified. Signs of Drowsy Driving:   * You don't remember driving the last few miles   * You may drift out of your paradise   * You are unable to focus and your thoughts wander   * You may yawn more often than normal   * You have difficulty keeping your eyes open / nodding off   * Missing traffic signs, speeding, or tailgating  Prevention-   Good sleep hygiene, lifestyle and behavioral choices have the most impact on drowsy driving. There is no substitute for sleep and the average person requires 8 hours nightly. If you find yourself driving drowsy, stop and sleep. Consider the sleep hygiene tips provided during your visit as well. Medication Refill Policy: Refills for all medications require 1 week advance notice. Please have your pharmacy fax a refill request. We are unable to fax, or call in \"controled substance\" medications and you will need to pick these prescriptions up from our office. [employer] Activation    Thank you for requesting access to [employer]. Please follow the instructions below to securely access and download your online medical record. [employer] allows you to send messages to your doctor, view your test results, renew your prescriptions, schedule appointments, and more. How Do I Sign Up? In your internet browser, go to [email or web address]. Click on the First Time User? Click Here link in the Sign In box. You will see the New Member Sign Up page. Enter your [employer] Access Code exactly as it appears below. You will not need to use this code after youve completed the sign-up process. If you do not sign up before the expiration date, you must request a new code.     [employer] Access Code: [email or web address]  Expires: 10/16/2022  9:11 AM (This is the date your [employer] access code will )    Enter the last four digits of your Social Security Number (xxxx) and Date of Birth (mm/dd/yyyy) as indicated and click Submit. You will be taken to the next sign-up page. Create a [employer] ID. This will be your [employer] login ID and cannot be changed, so think of one that is secure and easy to remember. Create a [employer] password. You can change your password at any time. Enter your Password Reset Question and Answer. This can be used at a later time if you forget your password. Enter your e-mail address. You will receive e-mail notification when new information is available in [address] Click Sign Up. You can now view and download portions of your medical record. Click the B[employer]stalk link to download a portable copy of your medical information. Additional Information    If you have questions, please call [phone]. Remember, [employer] is NOT to be used for urgent needs. For medical emergencies, dial 911.

## 2022-09-22 ENCOUNTER — TELEPHONE (OUTPATIENT)
Dept: FAMILY MEDICINE CLINIC | Age: 62
End: 2022-09-22

## 2022-09-22 DIAGNOSIS — F41.1 GAD (GENERALIZED ANXIETY DISORDER): ICD-10-CM

## 2022-09-22 DIAGNOSIS — F43.10 PTSD (POST-TRAUMATIC STRESS DISORDER): ICD-10-CM

## 2022-09-22 RX ORDER — TRAZODONE HYDROCHLORIDE 50 MG/1
75 TABLET ORAL
Qty: 120 TABLET | Refills: 3 | Status: SHIPPED | OUTPATIENT
Start: 2022-09-22

## 2022-09-22 NOTE — TELEPHONE ENCOUNTER
Pt states her Trazodone was increased from 50mg to 75mg. Pt is requesting a refill on the 75mg tablet. Please send to [facility] in [place] if approved.

## 2023-05-26 RX ORDER — PAROXETINE HYDROCHLORIDE 20 MG/1
TABLET, FILM COATED ORAL
COMMUNITY
Start: 2022-08-02 | End: 2023-07-03 | Stop reason: SDUPTHER

## 2023-05-26 RX ORDER — ALBUTEROL SULFATE 90 UG/1
2 AEROSOL, METERED RESPIRATORY (INHALATION) EVERY 4 HOURS PRN
COMMUNITY
Start: 2022-05-06 | End: 2023-07-03 | Stop reason: ALTCHOICE

## 2023-05-26 RX ORDER — TRAZODONE HYDROCHLORIDE 50 MG/1
75 TABLET ORAL
COMMUNITY
Start: 2022-09-22 | End: 2023-07-03

## 2023-05-26 RX ORDER — OMEPRAZOLE 20 MG/1
20 CAPSULE, DELAYED RELEASE ORAL DAILY
COMMUNITY

## 2023-07-03 ENCOUNTER — OFFICE VISIT (OUTPATIENT)
Age: 63
End: 2023-07-03

## 2023-07-03 VITALS
RESPIRATION RATE: 18 BRPM | WEIGHT: 198.6 LBS | BODY MASS INDEX: 33.9 KG/M2 | DIASTOLIC BLOOD PRESSURE: 76 MMHG | OXYGEN SATURATION: 98 % | SYSTOLIC BLOOD PRESSURE: 122 MMHG | TEMPERATURE: 97.9 F | HEIGHT: 64 IN | HEART RATE: 74 BPM

## 2023-07-03 DIAGNOSIS — F33.0 MAJOR DEPRESSIVE DISORDER, RECURRENT, MILD (HCC): ICD-10-CM

## 2023-07-03 DIAGNOSIS — G56.03 BILATERAL CARPAL TUNNEL SYNDROME: ICD-10-CM

## 2023-07-03 DIAGNOSIS — G47.33 OBSTRUCTIVE SLEEP APNEA (ADULT) (PEDIATRIC): ICD-10-CM

## 2023-07-03 DIAGNOSIS — F41.1 GENERALIZED ANXIETY DISORDER: ICD-10-CM

## 2023-07-03 DIAGNOSIS — E11.9 TYPE 2 DIABETES MELLITUS WITHOUT COMPLICATION, WITHOUT LONG-TERM CURRENT USE OF INSULIN (HCC): Primary | ICD-10-CM

## 2023-07-03 DIAGNOSIS — E78.00 PURE HYPERCHOLESTEROLEMIA, UNSPECIFIED: ICD-10-CM

## 2023-07-03 DIAGNOSIS — Z12.31 ENCOUNTER FOR SCREENING MAMMOGRAM FOR MALIGNANT NEOPLASM OF BREAST: ICD-10-CM

## 2023-07-03 DIAGNOSIS — K21.9 GASTRO-ESOPHAGEAL REFLUX DISEASE WITHOUT ESOPHAGITIS: ICD-10-CM

## 2023-07-03 LAB
ALBUMIN SERPL-MCNC: 3.8 G/DL (ref 3.5–5)
ALBUMIN/GLOB SERPL: 1.1 (ref 1.1–2.2)
ALP SERPL-CCNC: 108 U/L (ref 45–117)
ALT SERPL-CCNC: 22 U/L (ref 12–78)
ANION GAP SERPL CALC-SCNC: 5 MMOL/L (ref 5–15)
AST SERPL-CCNC: 14 U/L (ref 15–37)
BILIRUB SERPL-MCNC: 0.3 MG/DL (ref 0.2–1)
BUN SERPL-MCNC: 12 MG/DL (ref 6–20)
BUN/CREAT SERPL: 14 (ref 12–20)
CALCIUM SERPL-MCNC: 9.2 MG/DL (ref 8.5–10.1)
CHLORIDE SERPL-SCNC: 104 MMOL/L (ref 97–108)
CHOLEST SERPL-MCNC: 201 MG/DL
CO2 SERPL-SCNC: 29 MMOL/L (ref 21–32)
CREAT SERPL-MCNC: 0.83 MG/DL (ref 0.55–1.02)
CREAT UR-MCNC: 28.8 MG/DL
GLOBULIN SER CALC-MCNC: 3.4 G/DL (ref 2–4)
GLUCOSE SERPL-MCNC: 117 MG/DL (ref 65–100)
HDLC SERPL-MCNC: 61 MG/DL
HDLC SERPL: 3.3 (ref 0–5)
LDLC SERPL CALC-MCNC: 120 MG/DL (ref 0–100)
MICROALBUMIN UR-MCNC: <0.5 MG/DL
MICROALBUMIN/CREAT UR-RTO: <17 MG/G (ref 0–30)
POTASSIUM SERPL-SCNC: 3.9 MMOL/L (ref 3.5–5.1)
PROT SERPL-MCNC: 7.2 G/DL (ref 6.4–8.2)
SODIUM SERPL-SCNC: 138 MMOL/L (ref 136–145)
TRIGL SERPL-MCNC: 100 MG/DL
VLDLC SERPL CALC-MCNC: 20 MG/DL

## 2023-07-03 PROCEDURE — 99214 OFFICE O/P EST MOD 30 MIN: CPT | Performed by: NURSE PRACTITIONER

## 2023-07-03 PROCEDURE — 36415 COLL VENOUS BLD VENIPUNCTURE: CPT | Performed by: NURSE PRACTITIONER

## 2023-07-03 RX ORDER — PAROXETINE HYDROCHLORIDE 20 MG/1
10 TABLET, FILM COATED ORAL EVERY MORNING
Qty: 45 TABLET | Refills: 0
Start: 2023-07-03

## 2023-07-03 RX ORDER — MIRTAZAPINE 15 MG/1
15 TABLET, FILM COATED ORAL NIGHTLY
Qty: 90 TABLET | Refills: 0 | Status: SHIPPED | OUTPATIENT
Start: 2023-07-03

## 2023-07-03 RX ORDER — AMITRIPTYLINE HYDROCHLORIDE 10 MG/1
10 TABLET, FILM COATED ORAL NIGHTLY
Qty: 90 TABLET | Refills: 1 | Status: SHIPPED | OUTPATIENT
Start: 2023-07-03 | End: 2023-07-03 | Stop reason: CLARIF

## 2023-07-03 SDOH — ECONOMIC STABILITY: INCOME INSECURITY: HOW HARD IS IT FOR YOU TO PAY FOR THE VERY BASICS LIKE FOOD, HOUSING, MEDICAL CARE, AND HEATING?: NOT HARD AT ALL

## 2023-07-03 SDOH — ECONOMIC STABILITY: FOOD INSECURITY: WITHIN THE PAST 12 MONTHS, YOU WORRIED THAT YOUR FOOD WOULD RUN OUT BEFORE YOU GOT MONEY TO BUY MORE.: NEVER TRUE

## 2023-07-03 SDOH — ECONOMIC STABILITY: HOUSING INSECURITY
IN THE LAST 12 MONTHS, WAS THERE A TIME WHEN YOU DID NOT HAVE A STEADY PLACE TO SLEEP OR SLEPT IN A SHELTER (INCLUDING NOW)?: NO

## 2023-07-03 SDOH — ECONOMIC STABILITY: FOOD INSECURITY: WITHIN THE PAST 12 MONTHS, THE FOOD YOU BOUGHT JUST DIDN'T LAST AND YOU DIDN'T HAVE MONEY TO GET MORE.: NEVER TRUE

## 2023-07-03 ASSESSMENT — PATIENT HEALTH QUESTIONNAIRE - PHQ9
7. TROUBLE CONCENTRATING ON THINGS, SUCH AS READING THE NEWSPAPER OR WATCHING TELEVISION: 0
SUM OF ALL RESPONSES TO PHQ QUESTIONS 1-9: 0
SUM OF ALL RESPONSES TO PHQ9 QUESTIONS 1 & 2: 0
SUM OF ALL RESPONSES TO PHQ QUESTIONS 1-9: 0
4. FEELING TIRED OR HAVING LITTLE ENERGY: 0
SUM OF ALL RESPONSES TO PHQ QUESTIONS 1-9: 0
SUM OF ALL RESPONSES TO PHQ QUESTIONS 1-9: 0
5. POOR APPETITE OR OVEREATING: 0
1. LITTLE INTEREST OR PLEASURE IN DOING THINGS: 0
SUM OF ALL RESPONSES TO PHQ QUESTIONS 1-9: 0
SUM OF ALL RESPONSES TO PHQ QUESTIONS 1-9: 0
3. TROUBLE FALLING OR STAYING ASLEEP: 0
1. LITTLE INTEREST OR PLEASURE IN DOING THINGS: 0
SUM OF ALL RESPONSES TO PHQ QUESTIONS 1-9: 0
SUM OF ALL RESPONSES TO PHQ9 QUESTIONS 1 & 2: 0
2. FEELING DOWN, DEPRESSED OR HOPELESS: 0
6. FEELING BAD ABOUT YOURSELF - OR THAT YOU ARE A FAILURE OR HAVE LET YOURSELF OR YOUR FAMILY DOWN: 0
8. MOVING OR SPEAKING SO SLOWLY THAT OTHER PEOPLE COULD HAVE NOTICED. OR THE OPPOSITE, BEING SO FIGETY OR RESTLESS THAT YOU HAVE BEEN MOVING AROUND A LOT MORE THAN USUAL: 0
2. FEELING DOWN, DEPRESSED OR HOPELESS: 0
9. THOUGHTS THAT YOU WOULD BE BETTER OFF DEAD, OR OF HURTING YOURSELF: 0
SUM OF ALL RESPONSES TO PHQ QUESTIONS 1-9: 0

## 2023-07-03 NOTE — PROGRESS NOTES
Subjective:     CC: diabetes, NOLA, PTSD,     Laurel Frances is a 58 y.o. female who presents today to follow up for type 2 diabetes, NOLA, PTSD, and other chronic medical problems. I have not seen her in almost a year. Type 2 diabetes  Lab Results   Component Value Date    LABA1C 6.7 (H) 08/02/2022     08/02/2022     At the last OV (8-2022) her A1C had increased to 6.7, indicating a new onset of type 2 diabetes. She was started on Metformin 500mg daily and was supposed to follow up in 3 months but never returned until now. Today she states she did not have any side effects from the Metformin, but once the prescription ran out she did not ask for a refill so has been without it for months. She has not made any significant changes in her diet and reports some polyuria and polydipsia today. NOLA with PTSD  At the last OV she was feeling more depressed and not sleeping well. She was advised to take a WHOLE 20mg tablet of Paxil daily instead of half pill but she has not done this. She was also advised to increase her Trazodone to 75mg at bedtime to help her sleep but today she states this was not effective and she cannot tolerate a higher dose. Amitriptyline interacts with Paxil, will try Remeron. SOCORRO  She is on CPAP and followed by sleep specialist Dr Nichol Fernando. GERD  She is on Omeprazole 20mg at bedtime. Did not mention any symptoms today. Delayed gastric emptying  Of note pt underwent EGD by Dr Desirae Segal in 7-2020. Upper and middle esophageal mucosa was normal. There was erythema at the gastroesophageal junction. This was biopsied. There was food in the stomach, despite pt fasting. Erythema found in the antrum- bx was taken. Chronic hip pain  At the last OV she reported intermittent R hip pain that radiated down the right leg. She was advised to try an OTC NSAID. Today she states she started taking Aleve and now her hip only bothers her 1-2 times per month.     Carpal tunnel

## 2023-07-04 LAB
BASOPHILS # BLD: 0 K/UL (ref 0–0.1)
BASOPHILS NFR BLD: 1 % (ref 0–1)
DIFFERENTIAL METHOD BLD: ABNORMAL
EOSINOPHIL # BLD: 0.1 K/UL (ref 0–0.4)
EOSINOPHIL NFR BLD: 2 % (ref 0–7)
ERYTHROCYTE [DISTWIDTH] IN BLOOD BY AUTOMATED COUNT: 12.8 % (ref 11.5–14.5)
EST. AVERAGE GLUCOSE BLD GHB EST-MCNC: 114 MG/DL
HBA1C MFR BLD: 5.6 % (ref 4–5.6)
HCT VFR BLD AUTO: 40.1 % (ref 35–47)
HGB BLD-MCNC: 12.3 G/DL (ref 11.5–16)
IMM GRANULOCYTES # BLD AUTO: 0 K/UL (ref 0–0.04)
IMM GRANULOCYTES NFR BLD AUTO: 0 % (ref 0–0.5)
LYMPHOCYTES # BLD: 1.7 K/UL (ref 0.8–3.5)
LYMPHOCYTES NFR BLD: 45 % (ref 12–49)
MCH RBC QN AUTO: 28.7 PG (ref 26–34)
MCHC RBC AUTO-ENTMCNC: 30.7 G/DL (ref 30–36.5)
MCV RBC AUTO: 93.5 FL (ref 80–99)
MONOCYTES # BLD: 0.3 K/UL (ref 0–1)
MONOCYTES NFR BLD: 7 % (ref 5–13)
NEUTS SEG # BLD: 1.6 K/UL (ref 1.8–8)
NEUTS SEG NFR BLD: 45 % (ref 32–75)
NRBC # BLD: 0 K/UL (ref 0–0.01)
NRBC BLD-RTO: 0 PER 100 WBC
PLATELET # BLD AUTO: 177 K/UL (ref 150–400)
PMV BLD AUTO: 11.9 FL (ref 8.9–12.9)
RBC # BLD AUTO: 4.29 M/UL (ref 3.8–5.2)
WBC # BLD AUTO: 3.7 K/UL (ref 3.6–11)

## 2023-07-05 DIAGNOSIS — E11.9 TYPE 2 DIABETES MELLITUS WITHOUT COMPLICATION, WITHOUT LONG-TERM CURRENT USE OF INSULIN (HCC): ICD-10-CM

## 2023-07-05 DIAGNOSIS — E78.00 HYPERCHOLESTEREMIA: Primary | ICD-10-CM

## 2023-07-05 RX ORDER — SIMVASTATIN 10 MG
10 TABLET ORAL NIGHTLY
Qty: 90 TABLET | Refills: 0 | Status: SHIPPED | OUTPATIENT
Start: 2023-07-05

## 2023-07-26 ENCOUNTER — TELEPHONE (OUTPATIENT)
Age: 63
End: 2023-07-26

## 2023-07-26 RX ORDER — TRAZODONE HYDROCHLORIDE 50 MG/1
75 TABLET ORAL NIGHTLY
Qty: 120 TABLET | Refills: 0 | Status: SHIPPED | OUTPATIENT
Start: 2023-07-26

## 2023-07-26 NOTE — TELEPHONE ENCOUNTER
Pt states she cannot take the Mirtazapine 15 MG it make her depressed and tired in the morning. She wants to stay on Trazodone.

## 2023-10-03 ENCOUNTER — NURSE ONLY (OUTPATIENT)
Age: 63
End: 2023-10-03

## 2023-10-03 DIAGNOSIS — E11.9 TYPE 2 DIABETES MELLITUS WITHOUT COMPLICATION, WITHOUT LONG-TERM CURRENT USE OF INSULIN (HCC): ICD-10-CM

## 2023-10-03 DIAGNOSIS — E78.00 HYPERCHOLESTEREMIA: ICD-10-CM

## 2023-10-03 PROCEDURE — 36415 COLL VENOUS BLD VENIPUNCTURE: CPT | Performed by: NURSE PRACTITIONER

## 2023-10-04 LAB
CHOLEST SERPL-MCNC: 149 MG/DL
EST. AVERAGE GLUCOSE BLD GHB EST-MCNC: 128 MG/DL
HBA1C MFR BLD: 6.1 % (ref 4–5.6)
HDLC SERPL-MCNC: 68 MG/DL
HDLC SERPL: 2.2 (ref 0–5)
LDLC SERPL CALC-MCNC: 67 MG/DL (ref 0–100)
TRIGL SERPL-MCNC: 70 MG/DL
VLDLC SERPL CALC-MCNC: 14 MG/DL

## 2023-10-30 RX ORDER — PAROXETINE HYDROCHLORIDE 20 MG/1
TABLET, FILM COATED ORAL
Qty: 45 TABLET | Refills: 0 | Status: SHIPPED | OUTPATIENT
Start: 2023-10-30

## 2023-11-03 NOTE — PROGRESS NOTES
1. \"Have you been to the ER, urgent care clinic since your last visit? Hospitalized since your last visit? \" No    2. \"Have you seen or consulted any other health care providers outside of the [address] [place] [address] since your last visit? \" No     3. For patients aged 39-70: Has the patient had a colonoscopy / FIT/ Cologuard? No      If the patient is female:    4. For patients aged 41-77: Has the patient had a mammogram within the past 2 years? No      5. For patients aged 21-65: Has the patient had a pap smear? No    Chief Complaint   Patient presents with    Anxiety     Sleep problem       Visit Vitals  /72 (BP 1 Location: Left upper arm, BP Patient Position: Sitting)   Pulse 89   Temp 97.5 °F (36.4 °C) (Temporal)   Resp 18   Ht 5' 4\" (1.626 m)   Wt 207 lb 4 oz (94 kg)   SpO2 97%   BMI 35.57 kg/m²     1616  Labs drawn in right hand per Cal Bangura NP's orders. Patient tolerated well. 4 = No assist / stand by assistance

## 2024-05-07 ENCOUNTER — OFFICE VISIT (OUTPATIENT)
Age: 64
End: 2024-05-07

## 2024-05-07 DIAGNOSIS — G47.33 OBSTRUCTIVE SLEEP APNEA (ADULT) (PEDIATRIC): ICD-10-CM

## 2024-05-07 DIAGNOSIS — E11.9 TYPE 2 DIABETES MELLITUS WITHOUT COMPLICATION, WITHOUT LONG-TERM CURRENT USE OF INSULIN (HCC): Primary | ICD-10-CM

## 2024-05-07 DIAGNOSIS — F43.10 POST-TRAUMATIC STRESS DISORDER, UNSPECIFIED: ICD-10-CM

## 2024-05-07 DIAGNOSIS — F41.1 GENERALIZED ANXIETY DISORDER: ICD-10-CM

## 2024-05-07 DIAGNOSIS — K21.9 GASTRO-ESOPHAGEAL REFLUX DISEASE WITHOUT ESOPHAGITIS: ICD-10-CM

## 2024-05-07 DIAGNOSIS — F33.0 MAJOR DEPRESSIVE DISORDER, RECURRENT, MILD (HCC): ICD-10-CM

## 2024-05-07 DIAGNOSIS — G56.03 BILATERAL CARPAL TUNNEL SYNDROME: ICD-10-CM

## 2024-05-07 PROCEDURE — 99212 OFFICE O/P EST SF 10 MIN: CPT | Performed by: NURSE PRACTITIONER

## 2024-05-07 PROCEDURE — 36415 COLL VENOUS BLD VENIPUNCTURE: CPT | Performed by: NURSE PRACTITIONER

## 2024-05-07 RX ORDER — PREDNISONE 10 MG/1
TABLET ORAL
Qty: 21 TABLET | Refills: 0 | Status: SHIPPED | OUTPATIENT
Start: 2024-05-07

## 2024-05-07 SDOH — ECONOMIC STABILITY: FOOD INSECURITY: WITHIN THE PAST 12 MONTHS, YOU WORRIED THAT YOUR FOOD WOULD RUN OUT BEFORE YOU GOT MONEY TO BUY MORE.: NEVER TRUE

## 2024-05-07 SDOH — ECONOMIC STABILITY: FOOD INSECURITY: WITHIN THE PAST 12 MONTHS, THE FOOD YOU BOUGHT JUST DIDN'T LAST AND YOU DIDN'T HAVE MONEY TO GET MORE.: NEVER TRUE

## 2024-05-07 SDOH — ECONOMIC STABILITY: INCOME INSECURITY: HOW HARD IS IT FOR YOU TO PAY FOR THE VERY BASICS LIKE FOOD, HOUSING, MEDICAL CARE, AND HEATING?: NOT HARD AT ALL

## 2024-05-07 ASSESSMENT — PATIENT HEALTH QUESTIONNAIRE - PHQ9
SUM OF ALL RESPONSES TO PHQ QUESTIONS 1-9: 12
SUM OF ALL RESPONSES TO PHQ QUESTIONS 1-9: 12
1. LITTLE INTEREST OR PLEASURE IN DOING THINGS: SEVERAL DAYS
SUM OF ALL RESPONSES TO PHQ9 QUESTIONS 1 & 2: 2
SUM OF ALL RESPONSES TO PHQ QUESTIONS 1-9: 12
5. POOR APPETITE OR OVEREATING: MORE THAN HALF THE DAYS
7. TROUBLE CONCENTRATING ON THINGS, SUCH AS READING THE NEWSPAPER OR WATCHING TELEVISION: MORE THAN HALF THE DAYS
10. IF YOU CHECKED OFF ANY PROBLEMS, HOW DIFFICULT HAVE THESE PROBLEMS MADE IT FOR YOU TO DO YOUR WORK, TAKE CARE OF THINGS AT HOME, OR GET ALONG WITH OTHER PEOPLE: NOT DIFFICULT AT ALL
4. FEELING TIRED OR HAVING LITTLE ENERGY: SEVERAL DAYS
2. FEELING DOWN, DEPRESSED OR HOPELESS: SEVERAL DAYS
8. MOVING OR SPEAKING SO SLOWLY THAT OTHER PEOPLE COULD HAVE NOTICED. OR THE OPPOSITE, BEING SO FIGETY OR RESTLESS THAT YOU HAVE BEEN MOVING AROUND A LOT MORE THAN USUAL: MORE THAN HALF THE DAYS
9. THOUGHTS THAT YOU WOULD BE BETTER OFF DEAD, OR OF HURTING YOURSELF: NOT AT ALL
6. FEELING BAD ABOUT YOURSELF - OR THAT YOU ARE A FAILURE OR HAVE LET YOURSELF OR YOUR FAMILY DOWN: NOT AT ALL
3. TROUBLE FALLING OR STAYING ASLEEP: NEARLY EVERY DAY
SUM OF ALL RESPONSES TO PHQ QUESTIONS 1-9: 12

## 2024-05-07 NOTE — PROGRESS NOTES
Chief Complaint   Patient presents with    Medication Refill       Vitals:    05/07/24 1548   BP: (!) 145/75   Pulse: 95   Resp: 16   Temp: 98.2 °F (36.8 °C)   SpO2: 97%   \"Have you been to the ER, urgent care clinic since your last visit?  Hospitalized since your last visit?\"    NO    “Have you seen or consulted any other health care providers outside of [facility] since your last visit?”    NO    Have you had a mammogram?”   NO    Date of last Mammogram: 10/10/2020      “Have you had a pap smear?”    NO    No cervical cancer screening on file         “Have you had a colorectal cancer screening such as a colonoscopy/FIT/Cologuard?    NO    No colonoscopy on file  No cologuard on file  No FIT/FOBT on file   No flexible sigmoidoscopy on file         Click Here for Release of Records Request  
Labs drawn in right hand per Concepcion's orders. Pt tolerated well.  
auscultation bilaterally   Heart :RRR, S1 and S2 normal intensity, no extra heart sounds  Extremities: Non-edematous.   Musculoskeletal: +joint swelling in both hands, no TTP in hands or wrists. Good ROM  Neuro: Cranial nerves grossly normal. Good  strength bilaterally  Psych: Mood and thought content appropriate for situation. Dressed appropriately and with good hygiene.  Skin: Warm, dry, and intact. No lesions or discoloration.       Assessment/ Plan:     Type 2 diabetes  Check A1C, CBC, and CMP  She is OFF Metformin   She was encouraged to limit carbs and sugar   We discussed the importance of eye exams.   F/U 6 months or sooner pending A1C    NOLA and PTSD  Stable  She is trying to wean off Paxil   Cont Trazodone qHS-she does not want to try anything else for sleep     SOCORRO   Cont CPAP  F/U with sleep specialist as needed     GERD  Poorly controlled  Advised to increase PPI to BID  Avoid trigger foods  Sit upright after dinner x 2 hours before laying down  F/U prn if symptoms worsen or do not improve.       Carpal tunnel syndrome  Start prednisone 10mg- take 6 pills today then take 1 less pill every day until gone (#21, 0R)  Referred to PT  F/U prn, if no improvement will refer to Ortho.      Follow-up 6 months or sooner as needed    Verbal and written instructions (see AVS) provided.  Patient expresses understanding of diagnosis and treatment plan.    Health Maintenance Due   Topic Date Due    Pneumococcal 0-64 years Vaccine (1 of 2 - PCV) Never done    Diabetic foot exam  Never done    HIV screen  Never done    Diabetic retinal exam  Never done    Cervical cancer screen  Never done    Colorectal Cancer Screen  Never done    DTaP/Tdap/Td vaccine (1 - Tdap) 04/25/2019    Respiratory Syncytial Virus (RSV) Pregnant or age 60 yrs+ (1 - 1-dose 60+ series) Never done    Breast cancer screen  10/10/2021    COVID-19 Vaccine (3 - 2023-24 season) 09/01/2023         No follow-up provider specified.      Concepcion MONGE

## 2024-05-08 VITALS
RESPIRATION RATE: 16 BRPM | OXYGEN SATURATION: 97 % | TEMPERATURE: 98.2 F | SYSTOLIC BLOOD PRESSURE: 133 MMHG | WEIGHT: 200 LBS | BODY MASS INDEX: 34.15 KG/M2 | HEART RATE: 95 BPM | HEIGHT: 64 IN | DIASTOLIC BLOOD PRESSURE: 87 MMHG

## 2024-05-08 LAB
ALBUMIN SERPL-MCNC: 3.9 G/DL (ref 3.5–5)
ALBUMIN/GLOB SERPL: 1 (ref 1.1–2.2)
ALP SERPL-CCNC: 123 U/L (ref 45–117)
ALT SERPL-CCNC: 16 U/L (ref 12–78)
ANION GAP SERPL CALC-SCNC: 3 MMOL/L (ref 5–15)
AST SERPL-CCNC: 12 U/L (ref 15–37)
BASOPHILS # BLD: 0 K/UL (ref 0–0.1)
BASOPHILS NFR BLD: 0 % (ref 0–1)
BILIRUB SERPL-MCNC: 0.4 MG/DL (ref 0.2–1)
BUN SERPL-MCNC: 12 MG/DL (ref 6–20)
BUN/CREAT SERPL: 15 (ref 12–20)
CALCIUM SERPL-MCNC: 9.8 MG/DL (ref 8.5–10.1)
CHLORIDE SERPL-SCNC: 106 MMOL/L (ref 97–108)
CO2 SERPL-SCNC: 29 MMOL/L (ref 21–32)
CREAT SERPL-MCNC: 0.8 MG/DL (ref 0.55–1.02)
DIFFERENTIAL METHOD BLD: NORMAL
EOSINOPHIL # BLD: 0.1 K/UL (ref 0–0.4)
EOSINOPHIL NFR BLD: 1 % (ref 0–7)
ERYTHROCYTE [DISTWIDTH] IN BLOOD BY AUTOMATED COUNT: 12.5 % (ref 11.5–14.5)
EST. AVERAGE GLUCOSE BLD GHB EST-MCNC: 128 MG/DL
GLOBULIN SER CALC-MCNC: 4 G/DL (ref 2–4)
GLUCOSE SERPL-MCNC: 122 MG/DL (ref 65–100)
HBA1C MFR BLD: 6.1 % (ref 4–5.6)
HCT VFR BLD AUTO: 40 % (ref 35–47)
HGB BLD-MCNC: 12.7 G/DL (ref 11.5–16)
IMM GRANULOCYTES # BLD AUTO: 0 K/UL (ref 0–0.04)
IMM GRANULOCYTES NFR BLD AUTO: 0 % (ref 0–0.5)
LYMPHOCYTES # BLD: 2.2 K/UL (ref 0.8–3.5)
LYMPHOCYTES NFR BLD: 41 % (ref 12–49)
MCH RBC QN AUTO: 29.4 PG (ref 26–34)
MCHC RBC AUTO-ENTMCNC: 31.8 G/DL (ref 30–36.5)
MCV RBC AUTO: 92.6 FL (ref 80–99)
MONOCYTES # BLD: 0.4 K/UL (ref 0–1)
MONOCYTES NFR BLD: 8 % (ref 5–13)
NEUTS SEG # BLD: 2.6 K/UL (ref 1.8–8)
NEUTS SEG NFR BLD: 50 % (ref 32–75)
NRBC # BLD: 0 K/UL (ref 0–0.01)
NRBC BLD-RTO: 0 PER 100 WBC
PLATELET # BLD AUTO: 215 K/UL (ref 150–400)
PMV BLD AUTO: 11.9 FL (ref 8.9–12.9)
POTASSIUM SERPL-SCNC: 3.7 MMOL/L (ref 3.5–5.1)
PROT SERPL-MCNC: 7.9 G/DL (ref 6.4–8.2)
RBC # BLD AUTO: 4.32 M/UL (ref 3.8–5.2)
SODIUM SERPL-SCNC: 138 MMOL/L (ref 136–145)
WBC # BLD AUTO: 5.3 K/UL (ref 3.6–11)

## 2024-05-08 RX ORDER — TRAZODONE HYDROCHLORIDE 50 MG/1
50 TABLET ORAL NIGHTLY
Qty: 90 TABLET | Refills: 1 | Status: SHIPPED | OUTPATIENT
Start: 2024-05-08

## 2024-05-21 ENCOUNTER — HOSPITAL ENCOUNTER (OUTPATIENT)
Facility: HOSPITAL | Age: 64
Discharge: HOME | End: 2024-05-24

## 2024-05-21 DIAGNOSIS — Z12.31 ENCOUNTER FOR SCREENING MAMMOGRAM FOR MALIGNANT NEOPLASM OF BREAST: ICD-10-CM

## 2024-05-21 PROCEDURE — 77067 SCR MAMMO BI INCL CAD: CPT

## 2024-09-13 ENCOUNTER — TELEPHONE (OUTPATIENT)
Age: 64
End: 2024-09-13

## 2024-09-13 DIAGNOSIS — G56.03 BILATERAL CARPAL TUNNEL SYNDROME: Primary | ICD-10-CM

## 2024-09-19 ENCOUNTER — HOSPITAL ENCOUNTER (EMERGENCY)
Facility: HOSPITAL | Age: 64
Discharge: HOME | End: 2024-09-19
Attending: EMERGENCY MEDICINE

## 2024-09-19 VITALS
RESPIRATION RATE: 14 BRPM | DIASTOLIC BLOOD PRESSURE: 75 MMHG | SYSTOLIC BLOOD PRESSURE: 169 MMHG | BODY MASS INDEX: 37.57 KG/M2 | WEIGHT: 199 LBS | TEMPERATURE: 98.8 F | HEIGHT: 61 IN | HEART RATE: 109 BPM | OXYGEN SATURATION: 98 %

## 2024-09-19 DIAGNOSIS — K29.00 ACUTE GASTRITIS WITHOUT HEMORRHAGE, UNSPECIFIED GASTRITIS TYPE: Primary | ICD-10-CM

## 2024-09-19 LAB
ALBUMIN SERPL-MCNC: 3.8 G/DL (ref 3.5–5)
ALBUMIN/GLOB SERPL: 0.9 (ref 1.1–2.2)
ALP SERPL-CCNC: 119 U/L (ref 45–117)
ALT SERPL-CCNC: 20 U/L (ref 12–78)
ANION GAP SERPL CALC-SCNC: 7 MMOL/L (ref 2–12)
AST SERPL-CCNC: 12 U/L (ref 15–37)
BASOPHILS # BLD: 0 K/UL (ref 0–0.1)
BASOPHILS NFR BLD: 0 % (ref 0–1)
BILIRUB SERPL-MCNC: 0.3 MG/DL (ref 0.2–1)
BUN SERPL-MCNC: 9 MG/DL (ref 6–20)
BUN/CREAT SERPL: 11 (ref 12–20)
CALCIUM SERPL-MCNC: 9.6 MG/DL (ref 8.5–10.1)
CHLORIDE SERPL-SCNC: 101 MMOL/L (ref 97–108)
CO2 SERPL-SCNC: 29 MMOL/L (ref 21–32)
CREAT SERPL-MCNC: 0.82 MG/DL (ref 0.55–1.02)
DIFFERENTIAL METHOD BLD: ABNORMAL
EKG ATRIAL RATE: 101 BPM
EKG DIAGNOSIS: NORMAL
EKG P AXIS: 67 DEGREES
EKG P-R INTERVAL: 142 MS
EKG Q-T INTERVAL: 330 MS
EKG QRS DURATION: 90 MS
EKG QTC CALCULATION (BAZETT): 427 MS
EKG R AXIS: -6 DEGREES
EKG T AXIS: 43 DEGREES
EKG VENTRICULAR RATE: 101 BPM
EOSINOPHIL # BLD: 0 K/UL (ref 0–0.4)
EOSINOPHIL NFR BLD: 0 % (ref 0–7)
ERYTHROCYTE [DISTWIDTH] IN BLOOD BY AUTOMATED COUNT: 12.3 % (ref 11.5–14.5)
GLOBULIN SER CALC-MCNC: 4.4 G/DL (ref 2–4)
GLUCOSE SERPL-MCNC: 212 MG/DL (ref 65–100)
HCT VFR BLD AUTO: 37.8 % (ref 35–47)
HGB BLD-MCNC: 12.9 G/DL (ref 11.5–16)
IMM GRANULOCYTES # BLD AUTO: 0 K/UL (ref 0–0.04)
IMM GRANULOCYTES NFR BLD AUTO: 1 % (ref 0–0.5)
LIPASE SERPL-CCNC: 34 U/L (ref 13–75)
LYMPHOCYTES # BLD: 1 K/UL (ref 0.8–3.5)
LYMPHOCYTES NFR BLD: 21 % (ref 12–49)
MCH RBC QN AUTO: 29.7 PG (ref 26–34)
MCHC RBC AUTO-ENTMCNC: 34.1 G/DL (ref 30–36.5)
MCV RBC AUTO: 87.1 FL (ref 80–99)
MONOCYTES # BLD: 0.2 K/UL (ref 0–1)
MONOCYTES NFR BLD: 5 % (ref 5–13)
NEUTS SEG # BLD: 3.7 K/UL (ref 1.8–8)
NEUTS SEG NFR BLD: 73 % (ref 32–75)
NRBC # BLD: 0 K/UL (ref 0–0.01)
NRBC BLD-RTO: 0 PER 100 WBC
PLATELET # BLD AUTO: 187 K/UL (ref 150–400)
PMV BLD AUTO: 11.3 FL (ref 8.9–12.9)
POTASSIUM SERPL-SCNC: 3.9 MMOL/L (ref 3.5–5.1)
PROT SERPL-MCNC: 8.2 G/DL (ref 6.4–8.2)
RBC # BLD AUTO: 4.34 M/UL (ref 3.8–5.2)
SODIUM SERPL-SCNC: 137 MMOL/L (ref 136–145)
WBC # BLD AUTO: 5.1 K/UL (ref 3.6–11)

## 2024-09-19 PROCEDURE — 85025 COMPLETE CBC W/AUTO DIFF WBC: CPT

## 2024-09-19 PROCEDURE — 36415 COLL VENOUS BLD VENIPUNCTURE: CPT

## 2024-09-19 PROCEDURE — 99284 EMERGENCY DEPT VISIT MOD MDM: CPT

## 2024-09-19 PROCEDURE — 83690 ASSAY OF LIPASE: CPT

## 2024-09-19 PROCEDURE — 80053 COMPREHEN METABOLIC PANEL: CPT

## 2024-09-19 PROCEDURE — 6370000000 HC RX 637 (ALT 250 FOR IP): Performed by: EMERGENCY MEDICINE

## 2024-09-19 RX ORDER — FAMOTIDINE 20 MG/1
20 TABLET, FILM COATED ORAL
Status: COMPLETED | OUTPATIENT
Start: 2024-09-19 | End: 2024-09-19

## 2024-09-19 RX ORDER — MAGNESIUM HYDROXIDE/ALUMINUM HYDROXICE/SIMETHICONE 120; 1200; 1200 MG/30ML; MG/30ML; MG/30ML
30 SUSPENSION ORAL EVERY 6 HOURS PRN
Status: DISCONTINUED | OUTPATIENT
Start: 2024-09-19 | End: 2024-09-19 | Stop reason: HOSPADM

## 2024-09-19 RX ORDER — LIDOCAINE HYDROCHLORIDE 20 MG/ML
15 SOLUTION OROPHARYNGEAL
Status: COMPLETED | OUTPATIENT
Start: 2024-09-19 | End: 2024-09-19

## 2024-09-19 RX ORDER — PAROXETINE 20 MG/1
20 TABLET, FILM COATED ORAL EVERY MORNING
Qty: 90 TABLET | Refills: 0 | Status: SHIPPED | OUTPATIENT
Start: 2024-09-19

## 2024-09-19 RX ADMIN — ALUMINUM HYDROXIDE, MAGNESIUM HYDROXIDE, AND SIMETHICONE 30 ML: 1200; 120; 1200 SUSPENSION ORAL at 11:18

## 2024-09-19 RX ADMIN — LIDOCAINE HYDROCHLORIDE 15 ML: 20 SOLUTION ORAL at 11:18

## 2024-09-19 RX ADMIN — FAMOTIDINE 20 MG: 20 TABLET, FILM COATED ORAL at 11:18

## 2024-09-19 ASSESSMENT — LIFESTYLE VARIABLES
HOW OFTEN DO YOU HAVE A DRINK CONTAINING ALCOHOL: NEVER
HOW MANY STANDARD DRINKS CONTAINING ALCOHOL DO YOU HAVE ON A TYPICAL DAY: PATIENT DOES NOT DRINK

## 2024-09-19 ASSESSMENT — PAIN - FUNCTIONAL ASSESSMENT: PAIN_FUNCTIONAL_ASSESSMENT: NONE - DENIES PAIN

## 2024-09-24 LAB
EKG ATRIAL RATE: 101 BPM
EKG DIAGNOSIS: NORMAL
EKG P AXIS: 67 DEGREES
EKG P-R INTERVAL: 142 MS
EKG Q-T INTERVAL: 330 MS
EKG QRS DURATION: 90 MS
EKG QTC CALCULATION (BAZETT): 427 MS
EKG R AXIS: -6 DEGREES
EKG T AXIS: 43 DEGREES
EKG VENTRICULAR RATE: 101 BPM

## 2024-10-29 NOTE — TELEPHONE ENCOUNTER
Medication Refill Request    Stephanie Bender is requesting a refill of the following medication(s):     Trazodone 50 MG Tab    Please send refill to:     [facility] [address] [place] [address] - [address] [facility] -  [phone] - F [phone]  [address] [facility] [address]  Phone: [phone] Fax: [phone]

## 2024-10-30 RX ORDER — TRAZODONE HYDROCHLORIDE 50 MG/1
50 TABLET, FILM COATED ORAL NIGHTLY
Qty: 90 TABLET | Refills: 0 | Status: SHIPPED | OUTPATIENT
Start: 2024-10-30

## 2024-11-13 ENCOUNTER — OFFICE VISIT (OUTPATIENT)
Age: 64
End: 2024-11-13

## 2024-11-13 VITALS
TEMPERATURE: 97 F | HEIGHT: 61 IN | HEART RATE: 70 BPM | WEIGHT: 198 LBS | BODY MASS INDEX: 37.38 KG/M2 | SYSTOLIC BLOOD PRESSURE: 115 MMHG | RESPIRATION RATE: 18 BRPM | OXYGEN SATURATION: 98 % | DIASTOLIC BLOOD PRESSURE: 75 MMHG

## 2024-11-13 DIAGNOSIS — G56.03 BILATERAL CARPAL TUNNEL SYNDROME: ICD-10-CM

## 2024-11-13 DIAGNOSIS — E78.00 HYPERCHOLESTEREMIA: ICD-10-CM

## 2024-11-13 DIAGNOSIS — G47.33 OBSTRUCTIVE SLEEP APNEA (ADULT) (PEDIATRIC): ICD-10-CM

## 2024-11-13 DIAGNOSIS — F41.1 GENERALIZED ANXIETY DISORDER: ICD-10-CM

## 2024-11-13 DIAGNOSIS — E11.9 TYPE 2 DIABETES MELLITUS WITHOUT COMPLICATION, WITHOUT LONG-TERM CURRENT USE OF INSULIN (HCC): Primary | ICD-10-CM

## 2024-11-13 DIAGNOSIS — K21.9 GASTRO-ESOPHAGEAL REFLUX DISEASE WITHOUT ESOPHAGITIS: ICD-10-CM

## 2024-11-13 PROCEDURE — 3044F HG A1C LEVEL LT 7.0%: CPT | Performed by: NURSE PRACTITIONER

## 2024-11-13 PROCEDURE — 99212 OFFICE O/P EST SF 10 MIN: CPT | Performed by: NURSE PRACTITIONER

## 2024-11-13 PROCEDURE — 36415 COLL VENOUS BLD VENIPUNCTURE: CPT | Performed by: NURSE PRACTITIONER

## 2024-11-13 RX ORDER — SIMVASTATIN 10 MG
10 TABLET ORAL NIGHTLY
Qty: 90 TABLET | Refills: 1 | Status: SHIPPED | OUTPATIENT
Start: 2024-11-13

## 2024-11-13 RX ORDER — PAROXETINE 20 MG/1
20 TABLET, FILM COATED ORAL EVERY MORNING
Qty: 90 TABLET | Refills: 1 | Status: SHIPPED | OUTPATIENT
Start: 2024-11-13

## 2024-11-13 ASSESSMENT — PATIENT HEALTH QUESTIONNAIRE - PHQ9
1. LITTLE INTEREST OR PLEASURE IN DOING THINGS: NOT AT ALL
SUM OF ALL RESPONSES TO PHQ QUESTIONS 1-9: 0
5. POOR APPETITE OR OVEREATING: NOT AT ALL
10. IF YOU CHECKED OFF ANY PROBLEMS, HOW DIFFICULT HAVE THESE PROBLEMS MADE IT FOR YOU TO DO YOUR WORK, TAKE CARE OF THINGS AT HOME, OR GET ALONG WITH OTHER PEOPLE: NOT DIFFICULT AT ALL
SUM OF ALL RESPONSES TO PHQ QUESTIONS 1-9: 0
9. THOUGHTS THAT YOU WOULD BE BETTER OFF DEAD, OR OF HURTING YOURSELF: NOT AT ALL
SUM OF ALL RESPONSES TO PHQ QUESTIONS 1-9: 0
2. FEELING DOWN, DEPRESSED OR HOPELESS: NOT AT ALL
SUM OF ALL RESPONSES TO PHQ QUESTIONS 1-9: 0
4. FEELING TIRED OR HAVING LITTLE ENERGY: NOT AT ALL
8. MOVING OR SPEAKING SO SLOWLY THAT OTHER PEOPLE COULD HAVE NOTICED. OR THE OPPOSITE, BEING SO FIGETY OR RESTLESS THAT YOU HAVE BEEN MOVING AROUND A LOT MORE THAN USUAL: NOT AT ALL
SUM OF ALL RESPONSES TO PHQ9 QUESTIONS 1 & 2: 0
7. TROUBLE CONCENTRATING ON THINGS, SUCH AS READING THE NEWSPAPER OR WATCHING TELEVISION: NOT AT ALL
3. TROUBLE FALLING OR STAYING ASLEEP: NOT AT ALL
6. FEELING BAD ABOUT YOURSELF - OR THAT YOU ARE A FAILURE OR HAVE LET YOURSELF OR YOUR FAMILY DOWN: NOT AT ALL

## 2024-11-13 NOTE — PROGRESS NOTES
Labs drawn in left arm per Concepcion's orders.  Patient tolerated well.  
\"Have you been to the ER, urgent care clinic since your last visit?  Hospitalized since your last visit?\"    NO    “Have you seen or consulted any other health care providers outside our system since your last visit?”    NO     “Have you had a pap smear?”    NO    No cervical cancer screening on file       “Have you had a colorectal cancer screening such as a colonoscopy/FIT/Cologuard?    NO    No colonoscopy on file  No cologuard on file  No FIT/FOBT on file   No flexible sigmoidoscopy on file     “Have you had a diabetic eye exam?”    NO     No diabetic eye exam on file          
1-2 times per month.      Carpal tunnel syndrome  At the last OV she reported pain and numbness in her hands. Started 25 years ago. Sometimes it would be difficult for her to make a fist. She cleans a lot and uses her hands all day. Aleve was not helpful. She tried wearing a wrist brace.   At the last OV she was referred to PT but did not go. Today she states she is going to start November 25th. Pain is worse today, can't make a fist.      Health maintenance  PAP- she is aware this has been overdue for years    Mammo- done in 10/2020- normal, she has not been interested in repeating this.  Colonoscopy- never had one, not interested  PNA vaccine- due for prevnar 20- declines  Shingrix- not interested  Covid vaccine-she had both Moderna vaccines. No boosters- declines  RSV- declines  Flu shot- declines      Patient Active Problem List   Diagnosis    Trigger finger of left thumb    Fatty liver    NOLA (generalized anxiety disorder)    SOCORRO (obstructive sleep apnea)    Esophageal reflux    Type 2 diabetes mellitus without complication, without long-term current use of insulin (HCC)    Delayed gastric emptying    Pure hypercholesterolemia    Chronic allergic rhinitis    Nonallergic rhinitis    Bilateral carpal tunnel syndrome    Major depressive disorder, recurrent, mild (HCC)       Past Medical History:   Diagnosis Date    Anxiety     Depression     Esophageal reflux     Insomnia     RMSF (Ellis [place] spotted fever) 03/2012         Current Outpatient Medications:     traZODone (DESYREL) 50 MG tablet, Take 1 tablet by mouth nightly, Disp: 90 tablet, Rfl: 0    omeprazole (PRILOSEC) 20 MG delayed release capsule, Take 1 capsule by mouth daily, Disp: 30 capsule, Rfl: 3    PARoxetine (PAXIL) 20 MG tablet, Take 1 tablet by mouth every morning, Disp: 90 tablet, Rfl: 0    predniSONE (DELTASONE) 10 MG tablet, Take 6 pills today then take 1 less pill every day until gone, Disp: 21 tablet, Rfl: 0    simvastatin (ZOCOR) 10 MG

## 2024-11-14 LAB
CHOLEST SERPL-MCNC: 216 MG/DL
EST. AVERAGE GLUCOSE BLD GHB EST-MCNC: 143 MG/DL
HBA1C MFR BLD: 6.6 % (ref 4–5.6)
HDLC SERPL-MCNC: 63 MG/DL
HDLC SERPL: 3.4 (ref 0–5)
LDLC SERPL CALC-MCNC: 117.8 MG/DL (ref 0–100)
TRIGL SERPL-MCNC: 176 MG/DL
VLDLC SERPL CALC-MCNC: 35.2 MG/DL

## 2024-11-26 ENCOUNTER — HOSPITAL ENCOUNTER (OUTPATIENT)
Facility: HOSPITAL | Age: 64
Setting detail: RECURRING SERIES
Discharge: HOME | End: 2024-11-29

## 2024-11-26 PROCEDURE — 97165 OT EVAL LOW COMPLEX 30 MIN: CPT

## 2024-11-26 PROCEDURE — 97110 THERAPEUTIC EXERCISES: CPT

## 2024-12-10 ENCOUNTER — HOSPITAL ENCOUNTER (OUTPATIENT)
Facility: HOSPITAL | Age: 64
Setting detail: RECURRING SERIES
Discharge: HOME | End: 2024-12-13

## 2024-12-10 PROCEDURE — 97110 THERAPEUTIC EXERCISES: CPT

## 2024-12-10 PROCEDURE — 97018 PARAFFIN BATH THERAPY: CPT

## 2024-12-10 NOTE — PROGRESS NOTES
OCCUPATIONAL THERAPY - DAILY TREATMENT NOTE (updated 3/23)      Date: 12/10/2024          Patient Name:  Stephanie Bender :  1960   Medical   Diagnosis:  Bilateral carpal tunnel syndrome [G56.03] Treatment Diagnosis:  M79.644  Pain in right finger(s) and M79.645  Pain in left finger(s)    Referral Source:  Concepcion Ruano APRN * Insurance:   Payor: /                     Patient  verified yes     Visit #   Current  / Total 2 6   Time   In / Out 1530 1615   Total Treatment Time 45   Total Timed Codes 45         SUBJECTIVE    Pain Level (0-10 scale): worse after doing exercises    Any medication changes, allergies to medications, adverse drug reactions, diagnosis change, or new procedure performed?: [x] No    [] Yes (see summary sheet for update)  Medications: Verified on Patient Summary List    Subjective functional status/changes:     Pt says she feels worse after doing HEP.  Pain w/hand flexion 10/10    OBJECTIVE      Therapeutic Procedures:  Tx Min Billable or 1:1 Min (if diff from Tx Min) Procedure, Rationale, Specifics            35 [id number] Therapeutic Exercise (timed):  increase ROM, strength, coordination, balance, and proprioception to improve patient's ability to progress to PLOF and address remaining functional goals. (see flow sheet as applicable)    Details if applicable: GH joint mob grade 2-3 10 reps II-V L and R hand all joints followed by AAROM and stretch for finger flexion, intrinsics, full fist with wrist flexion and then wrist extension bilaterally; fabricated custom splint L digit IV for PIP blocking due to painful triggering   35 35    Total Total         Modalities Rationale:     decrease pain and increase tissue extensibility to improve patient's ability to progress to PLOF and address remaining functional goals.       min [] Estim Unattended,             type/location:       []  w/ice    []  w/heat          min [] Estim Attended,             type/location:       []  w/ice   []

## 2024-12-16 ENCOUNTER — HOSPITAL ENCOUNTER (EMERGENCY)
Facility: HOSPITAL | Age: 64
Discharge: HOME | End: 2024-12-16

## 2024-12-16 VITALS
HEIGHT: 61 IN | SYSTOLIC BLOOD PRESSURE: 147 MMHG | WEIGHT: 190 LBS | DIASTOLIC BLOOD PRESSURE: 75 MMHG | BODY MASS INDEX: 35.87 KG/M2 | RESPIRATION RATE: 16 BRPM | TEMPERATURE: 98.1 F | HEART RATE: 85 BPM | OXYGEN SATURATION: 98 %

## 2024-12-16 DIAGNOSIS — M25.511 ACUTE PAIN OF RIGHT SHOULDER: Primary | ICD-10-CM

## 2024-12-16 PROCEDURE — 6360000002 HC RX W HCPCS

## 2024-12-16 PROCEDURE — 99284 EMERGENCY DEPT VISIT MOD MDM: CPT

## 2024-12-16 PROCEDURE — 96372 THER/PROPH/DIAG INJ SC/IM: CPT

## 2024-12-16 RX ORDER — MELOXICAM 7.5 MG/1
7.5 TABLET ORAL DAILY
Qty: 30 TABLET | Refills: 0 | Status: SHIPPED | OUTPATIENT
Start: 2024-12-16

## 2024-12-16 RX ORDER — LIDOCAINE 50 MG/G
1 PATCH TOPICAL DAILY
Qty: 10 PATCH | Refills: 0 | Status: SHIPPED | OUTPATIENT
Start: 2024-12-16 | End: 2024-12-26

## 2024-12-16 RX ORDER — KETOROLAC TROMETHAMINE 15 MG/ML
15 INJECTION, SOLUTION INTRAMUSCULAR; INTRAVENOUS ONCE
Status: COMPLETED | OUTPATIENT
Start: 2024-12-16 | End: 2024-12-16

## 2024-12-16 RX ADMIN — KETOROLAC TROMETHAMINE 15 MG: 15 INJECTION, SOLUTION INTRAMUSCULAR; INTRAVENOUS at 19:17

## 2024-12-16 ASSESSMENT — PAIN SCALES - GENERAL
PAINLEVEL_OUTOF10: 10
PAINLEVEL_OUTOF10: 10

## 2024-12-16 ASSESSMENT — PAIN DESCRIPTION - LOCATION
LOCATION: SHOULDER
LOCATION: SHOULDER

## 2024-12-16 ASSESSMENT — PAIN DESCRIPTION - ORIENTATION
ORIENTATION: RIGHT
ORIENTATION: RIGHT

## 2024-12-16 ASSESSMENT — PAIN DESCRIPTION - DESCRIPTORS
DESCRIPTORS: ACHING
DESCRIPTORS: ACHING

## 2024-12-16 ASSESSMENT — PAIN - FUNCTIONAL ASSESSMENT: PAIN_FUNCTIONAL_ASSESSMENT: 0-10

## 2024-12-16 NOTE — ED TRIAGE NOTES
Pt presents to ED via POV with a c/o right shoulder pain x 5 days. She has tried OTC pain medication that has not relieved the pain.

## 2024-12-17 ENCOUNTER — HOSPITAL ENCOUNTER (OUTPATIENT)
Facility: HOSPITAL | Age: 64
Setting detail: RECURRING SERIES
Discharge: HOME | End: 2024-12-20

## 2024-12-17 PROCEDURE — 97035 APP MDLTY 1+ULTRASOUND EA 15: CPT

## 2024-12-17 PROCEDURE — 97110 THERAPEUTIC EXERCISES: CPT

## 2024-12-17 NOTE — ED PROVIDER NOTES
[facility] EMERGENCY DEP  EMERGENCY DEPARTMENT ENCOUNTER       Pt Name: Stephanie Bender  MRN: [id number]  Birthdate 1960  Date of evaluation: 12/16/2024  Provider: Rupal Carbajal PA-C   PCP: Concepcion Ruano APRN - NP  Note Started: 7:09 PM EST 12/16/24     CHIEF COMPLAINT       Chief Complaint   Patient presents with    Shoulder Pain        HISTORY OF PRESENT ILLNESS: 1 or more elements      History From: Patient  HPI Limitations: None     Stephanie Bender is a 64 y.o. female with past medical history of NOLA, type 2 diabetes, hypercholesterolemia, bilateral carpal tunnel, depression, fatty liver who presents complaining of right shoulder pain x 5 days.  Patient reports she is experiencing an aching pain in her right shoulder.  She reports that it is worse with overhead movement.  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  She reports that she is currently seeing orthopedic specialist for carpal tunnel in her hands and was told that she has arthritis in her hands and notes that the pain in her shoulder feels similar to this.  She is scheduled to see orthopedist tomorrow for her hands.  She reports taking Tylenol without much relief.  She denies any traumatic injury recent fall.  She denies any fever, chills, nausea, vomiting, extremity numbness, extremity weakness, extremity tingling.  She denies any chest pain or shortness of breath.  She denies any neck pain, neck stiffness.  She denies any abdominal pain, bowel/bladder changes.     Nursing Notes were all reviewed and agreed with or any disagreements were addressed in the HPI.     REVIEW OF SYSTEMS      Review of Systems     Positives and Pertinent negatives as per HPI.    PAST HISTORY     Past Medical History:  Past Medical History:   Diagnosis Date    Anxiety     Depression     Esophageal reflux     Insomnia     RMSF (Ellis Mountain spotted fever) 03/2012       Past Surgical

## 2024-12-17 NOTE — PROGRESS NOTES
OCCUPATIONAL THERAPY - DAILY TREATMENT NOTE (updated 3/23)      Date: 2024          Patient Name:  Stephanie Bender :  1960   Medical   Diagnosis:  Bilateral carpal tunnel syndrome [G56.03] Treatment Diagnosis:  M25.641  Stiffness of right hand, M25.642  Stiffness of left hand, M79.644  Pain in right finger(s), and M79.645  Pain in left finger(s)  and M62.81  GENERAL MUSCLE WEAKNESS    Referral Source:  Concepcion uRano APRN * Insurance:   Payor: /                     Patient  verified yes     Visit #   Current  / Total 3 6   Time   In / Out 1525 1610   Total Treatment Time 45   Total Timed Codes 45         SUBJECTIVE    Pain Level (0-10 scale): Pain in R shoulder today    Any medication changes, allergies to medications, adverse drug reactions, diagnosis change, or new procedure performed?: [x] No    [] Yes (see summary sheet for update)  Medications: Verified on Patient Summary List    Subjective functional status/changes:     Pt reports pain in the R shoulder so bad she needed to go to ED 2 days ago. Presents in sling. Had pain injection.  Said injection didn't help much. Pain now is deep in shoulder no trauma.      OBJECTIVE      Therapeutic Procedures:  Tx Min Billable or 1:1 Min (if diff from Tx Min) Procedure, Rationale, Specifics   25 25 [id number] Therapeutic Exercise (timed):  increase ROM, strength, coordination, balance, and proprioception to improve patient's ability to progress to PLOF and address remaining functional goals. (see flow sheet as applicable)    Details if applicable:  deep tissue massage bilateral hands after US, exam R shoulder   25 25    Total Total         Modalities Rationale:     decrease inflammation and decrease pain to improve patient's ability to progress to PLOF and address remaining functional goals.       min [] Estim Unattended,             type/location:       []  w/ice    []  w/heat          min [] Estim Attended,             type/location:       []  w/ice   []

## 2024-12-18 ENCOUNTER — HOSPITAL ENCOUNTER (EMERGENCY)
Facility: HOSPITAL | Age: 64
Discharge: HOME | End: 2024-12-18
Attending: EMERGENCY MEDICINE

## 2024-12-18 ENCOUNTER — HOSPITAL ENCOUNTER (EMERGENCY)
Facility: HOSPITAL | Age: 64
Discharge: HOME | End: 2024-12-21

## 2024-12-18 VITALS
OXYGEN SATURATION: 97 % | DIASTOLIC BLOOD PRESSURE: 97 MMHG | SYSTOLIC BLOOD PRESSURE: 130 MMHG | RESPIRATION RATE: 18 BRPM | HEART RATE: 102 BPM | HEIGHT: 61 IN | TEMPERATURE: 98.3 F | BODY MASS INDEX: 35.87 KG/M2 | WEIGHT: 190 LBS

## 2024-12-18 DIAGNOSIS — M25.511 ACUTE PAIN OF RIGHT SHOULDER: Primary | ICD-10-CM

## 2024-12-18 PROCEDURE — 99284 EMERGENCY DEPT VISIT MOD MDM: CPT

## 2024-12-18 PROCEDURE — 73030 X-RAY EXAM OF SHOULDER: CPT

## 2024-12-18 PROCEDURE — 6360000002 HC RX W HCPCS: Performed by: EMERGENCY MEDICINE

## 2024-12-18 PROCEDURE — 96372 THER/PROPH/DIAG INJ SC/IM: CPT

## 2024-12-18 RX ORDER — DEXAMETHASONE SODIUM PHOSPHATE 10 MG/ML
8 INJECTION, SOLUTION INTRAMUSCULAR; INTRAVENOUS ONCE
Status: COMPLETED | OUTPATIENT
Start: 2024-12-18 | End: 2024-12-18

## 2024-12-18 RX ORDER — HYDROCODONE BITARTRATE AND ACETAMINOPHEN 5; 325 MG/1; MG/1
1 TABLET ORAL EVERY 6 HOURS PRN
Qty: 12 TABLET | Refills: 0 | Status: SHIPPED | OUTPATIENT
Start: 2024-12-18 | End: 2024-12-21

## 2024-12-18 RX ADMIN — DEXAMETHASONE SODIUM PHOSPHATE 8 MG: 10 INJECTION, SOLUTION INTRAMUSCULAR; INTRAVENOUS at 09:42

## 2024-12-18 ASSESSMENT — PAIN DESCRIPTION - ORIENTATION
ORIENTATION: RIGHT
ORIENTATION: RIGHT

## 2024-12-18 ASSESSMENT — PAIN SCALES - GENERAL
PAINLEVEL_OUTOF10: 8
PAINLEVEL_OUTOF10: 10
PAINLEVEL_OUTOF10: 8

## 2024-12-18 ASSESSMENT — PAIN - FUNCTIONAL ASSESSMENT
PAIN_FUNCTIONAL_ASSESSMENT: 0-10
PAIN_FUNCTIONAL_ASSESSMENT: 0-10
PAIN_FUNCTIONAL_ASSESSMENT: PREVENTS OR INTERFERES WITH MANY ACTIVE NOT PASSIVE ACTIVITIES

## 2024-12-18 ASSESSMENT — PAIN DESCRIPTION - LOCATION
LOCATION: SHOULDER
LOCATION: SHOULDER

## 2024-12-18 ASSESSMENT — ENCOUNTER SYMPTOMS
ABDOMINAL PAIN: 0
COUGH: 0
DIARRHEA: 0
SORE THROAT: 0
EYE REDNESS: 0
NAUSEA: 0
SHORTNESS OF BREATH: 0
VOMITING: 0

## 2024-12-18 ASSESSMENT — PAIN DESCRIPTION - DESCRIPTORS: DESCRIPTORS: ACHING

## 2024-12-18 ASSESSMENT — PAIN DESCRIPTION - PAIN TYPE: TYPE: ACUTE PAIN

## 2024-12-18 NOTE — ED PROVIDER NOTES
EMERGENCY DEPARTMENT HISTORY AND PHYSICAL EXAM      Date: 12/18/2024  Patient Name: Stephanie Bender    History of Presenting Illness     Chief Complaint   Patient presents with    Shoulder Pain       History Provided By: Patient     HPI: Stephanie Bender, 64 y.o. female with past medical history listed below, presents via private vehicle to the ED with cc of right shoulder pain.  Patient reports right shoulder pain has been ongoing for several weeks.  Denies any falls or trauma or known injury.  She was seen here 2 days ago for the same complaints.  Due to her diabetes PA did not wish to start her on any steroids and started her on Mobic and lidocaine patch and gave her a sling for brief use.  Patient report Jackson symptoms.  No specific change in the symptoms.  Patient states she cannot sleep due to the pain.        There are no other complaints, changes, or physical findings at this time.    PCP: Concepcion Ruano APRN - NP    No current facility-administered medications on file prior to encounter.     Current Outpatient Medications on File Prior to Encounter   Medication Sig Dispense Refill    meloxicam (MOBIC) 7.5 MG tablet Take 1 tablet by mouth daily 30 tablet 0    lidocaine (LIDODERM) 5 % Place 1 patch onto the skin daily for 10 days 12 hours on, 12 hours off. 10 patch 0    metFORMIN (GLUCOPHAGE) 500 MG tablet Take 1 tablet by mouth daily (with breakfast) 90 tablet 0    omeprazole (PRILOSEC) 20 MG delayed release capsule Take 1 capsule by mouth in the morning and at bedtime 180 capsule 0    PARoxetine (PAXIL) 20 MG tablet Take 1 tablet by mouth every morning 90 tablet 1    simvastatin (ZOCOR) 10 MG tablet Take 1 tablet by mouth nightly 90 tablet 1    traZODone (DESYREL) 50 MG tablet Take 1 tablet by mouth nightly 90 tablet 0       Past History     Past Medical History:  Past Medical History:   Diagnosis Date    Anxiety     Depression     Esophageal reflux     Insomnia     RMSF (Ellis Mountain spotted fever)

## 2024-12-18 NOTE — ED TRIAGE NOTES
Right shoulder pain x 1 week , seen here for same with no relief from Meloxicam that was prescribed on her ED visit . No new injury

## 2024-12-20 ENCOUNTER — TELEPHONE (OUTPATIENT)
Age: 64
End: 2024-12-20

## 2024-12-20 ENCOUNTER — CLINICAL DOCUMENTATION (OUTPATIENT)
Age: 64
End: 2024-12-20

## 2024-12-20 DIAGNOSIS — M25.511 ACUTE PAIN OF RIGHT SHOULDER: Primary | ICD-10-CM

## 2024-12-20 NOTE — TELEPHONE ENCOUNTER
Patient went to ED on 12-18 for acute right shoulder pain without prior injury.  X-ray was normal.  She was given a sling and discharged home with a small supply of hydrocodone along with meloxicam 7.5 mg daily.  The ER had referred her to Ortho Dr. Menon but she has no insurance and was hesitant to schedule an appointment.  Physical therapy wanted to know if they should treat. (They are already seeing her for carpal tunnel). Please call patient and ask her if the meloxicam is helping.  If not, we should try a prednisone Dosepak before pursuing PT. (She has only had the pain 1 week). Let me know if she wants a prescription.

## 2024-12-20 NOTE — PROGRESS NOTES
Patient has been doing physical therapy for carpal tunnel syndrome.  Received note from physical therapist stating patient went to ED on 1218 for acute right shoulder pain without prior injury.  X-ray was normal.  She was given a sling and discharged home with a small supply of hydrocodone along with meloxicam 7.5 mg daily.  The ER had referred her to Ortho Dr. Menon but she has no insurance and was hesitant to schedule an appointment.  Physical therapy wanted to know if they should treat.  Call patient and ask her how the meloxicam is doing.  May try prednisone Dosepak before pursuing PT.

## 2024-12-23 NOTE — TELEPHONE ENCOUNTER
Patient identified by Name and Date of birth.     Results given as directed by provider.     Patient would like prednisone sent to [facility] in [place].

## 2024-12-24 RX ORDER — PREDNISONE 10 MG/1
TABLET ORAL
Qty: 21 TABLET | Refills: 0 | Status: SHIPPED | OUTPATIENT
Start: 2024-12-24

## 2025-02-16 RX ORDER — TRAZODONE HYDROCHLORIDE 50 MG/1
50 TABLET, FILM COATED ORAL NIGHTLY
Qty: 90 TABLET | Refills: 0 | Status: SHIPPED | OUTPATIENT
Start: 2025-02-16

## 2025-05-30 DIAGNOSIS — E78.00 HYPERCHOLESTEREMIA: ICD-10-CM

## 2025-05-30 RX ORDER — TRAZODONE HYDROCHLORIDE 50 MG/1
50 TABLET ORAL NIGHTLY
Qty: 90 TABLET | Refills: 0 | OUTPATIENT
Start: 2025-05-30

## 2025-05-30 RX ORDER — SIMVASTATIN 10 MG
10 TABLET ORAL NIGHTLY
Qty: 90 TABLET | Refills: 0 | OUTPATIENT
Start: 2025-05-30

## 2025-07-11 NOTE — TELEPHONE ENCOUNTER
PT stopped into office and said she hasn't slept in 7 days and was asking if she could get enough of her pills to get her to her appt on 7/24.    traZODone (DESYREL) 50 MG tablet [[id number]]     [facility] [address] [place] [address] - [address] [place] [address] - P [phone] - F [phone] [[id number]]

## 2025-07-14 RX ORDER — TRAZODONE HYDROCHLORIDE 50 MG/1
50 TABLET ORAL NIGHTLY
Qty: 30 TABLET | Refills: 0 | Status: SHIPPED | OUTPATIENT
Start: 2025-07-14

## 2025-07-24 ENCOUNTER — OFFICE VISIT (OUTPATIENT)
Age: 65
End: 2025-07-24

## 2025-07-24 DIAGNOSIS — E78.00 HYPERCHOLESTEREMIA: ICD-10-CM

## 2025-07-24 DIAGNOSIS — F51.04 PSYCHOPHYSIOLOGICAL INSOMNIA: ICD-10-CM

## 2025-07-24 DIAGNOSIS — G47.33 OBSTRUCTIVE SLEEP APNEA (ADULT) (PEDIATRIC): ICD-10-CM

## 2025-07-24 DIAGNOSIS — G56.03 BILATERAL CARPAL TUNNEL SYNDROME: ICD-10-CM

## 2025-07-24 DIAGNOSIS — K21.9 GASTROESOPHAGEAL REFLUX DISEASE WITHOUT ESOPHAGITIS: ICD-10-CM

## 2025-07-24 DIAGNOSIS — G89.29 CHRONIC PAIN OF BOTH SHOULDERS: ICD-10-CM

## 2025-07-24 DIAGNOSIS — M25.512 CHRONIC PAIN OF BOTH SHOULDERS: ICD-10-CM

## 2025-07-24 DIAGNOSIS — M25.511 CHRONIC PAIN OF BOTH SHOULDERS: ICD-10-CM

## 2025-07-24 DIAGNOSIS — F41.1 GAD (GENERALIZED ANXIETY DISORDER): ICD-10-CM

## 2025-07-24 DIAGNOSIS — F51.5 NIGHTMARES: ICD-10-CM

## 2025-07-24 DIAGNOSIS — E11.9 TYPE 2 DIABETES MELLITUS WITHOUT COMPLICATION, WITHOUT LONG-TERM CURRENT USE OF INSULIN (HCC): Primary | ICD-10-CM

## 2025-07-24 PROBLEM — J31.0 NONALLERGIC RHINITIS: Status: RESOLVED | Noted: 2018-06-22 | Resolved: 2025-07-24

## 2025-07-24 PROCEDURE — 99212 OFFICE O/P EST SF 10 MIN: CPT | Performed by: NURSE PRACTITIONER

## 2025-07-24 RX ORDER — PAROXETINE 20 MG/1
20 TABLET, FILM COATED ORAL EVERY MORNING
Qty: 90 TABLET | Refills: 3 | Status: SHIPPED | OUTPATIENT
Start: 2025-07-24

## 2025-07-24 RX ORDER — PRAZOSIN HYDROCHLORIDE 1 MG/1
CAPSULE ORAL
Qty: 60 CAPSULE | Refills: 0 | Status: SHIPPED | OUTPATIENT
Start: 2025-07-24

## 2025-07-24 RX ORDER — MELOXICAM 7.5 MG/1
7.5 TABLET ORAL DAILY PRN
Qty: 90 TABLET | Refills: 0 | Status: SHIPPED | OUTPATIENT
Start: 2025-07-24

## 2025-07-24 RX ORDER — OMEPRAZOLE 20 MG/1
20 CAPSULE, DELAYED RELEASE ORAL
Qty: 90 CAPSULE | Refills: 3 | Status: SHIPPED | OUTPATIENT
Start: 2025-07-24

## 2025-07-24 RX ORDER — SIMVASTATIN 10 MG
10 TABLET ORAL NIGHTLY
Qty: 90 TABLET | Refills: 3 | Status: SHIPPED | OUTPATIENT
Start: 2025-07-24

## 2025-07-24 SDOH — ECONOMIC STABILITY: FOOD INSECURITY: WITHIN THE PAST 12 MONTHS, YOU WORRIED THAT YOUR FOOD WOULD RUN OUT BEFORE YOU GOT MONEY TO BUY MORE.: NEVER TRUE

## 2025-07-24 SDOH — ECONOMIC STABILITY: FOOD INSECURITY: WITHIN THE PAST 12 MONTHS, THE FOOD YOU BOUGHT JUST DIDN'T LAST AND YOU DIDN'T HAVE MONEY TO GET MORE.: NEVER TRUE

## 2025-07-24 ASSESSMENT — PATIENT HEALTH QUESTIONNAIRE - PHQ9
1. LITTLE INTEREST OR PLEASURE IN DOING THINGS: NOT AT ALL
SUM OF ALL RESPONSES TO PHQ QUESTIONS 1-9: 0
2. FEELING DOWN, DEPRESSED OR HOPELESS: NOT AT ALL

## 2025-07-24 NOTE — PROGRESS NOTES
Subjective:     CC: diabetes, NOLA     Stephanie Bender is a 64 y.o. female who presents today for a 6 month follow up for type 2 diabetes, NOLA, and other chronic medical problems.     Type 2 diabetes, diet controlled  Lab Results   Component Value Date    LABA1C 6.6 (H) 11/13/2024     11/13/2024     Dx'd in 8-2022.   Last A1c she was advised to start metformin 500mg daily. Samantha well.    Does not check sugar at home.   She has NOT been limiting her carbs or sugar.     Peripheral neuropathy: she reports very mild numbness/tingling in the feet on a rare occasion.     Last eye exam: long time overdue, she is aware and has been advised to get her eyes checked.       NOLA with Depression   Symptoms have been inadequately controlled for a long time but she has been too fearful to increase her dose of Paxil daily or try something else. Currently taking 20mg daily.    Insomnia with nightmares  She takes Trazodone 50mg and 2 melatonins at bedtime. It does not help much. She cannot tolerate a higher dose of trazodone.     Reports vivid nightmares but denies any past trauma.  She will have nightmares that people are stabbing her, and she can feel the pain in her sleep.  She will have other nightmares where she is driving over the a bridge without any guardrails and it is pitch black dark.  Sometimes she is locked in a aissatou potty that is thrown into the ocean.      She does not know where these nightmares come from. she did have a \"nervous breakdown\" over 30 years ago.  Today I recommended prazosin 1 mg at bedtime.      If she is still not sleeping through the night after a month we will add some mirtazapine.         HLD  Lab Results   Component Value Date    CHOL 216 (H) 11/13/2024    TRIG 176 (H) 11/13/2024    HDL 63 11/13/2024    .8 (H) 11/13/2024    VLDL 35.2 11/13/2024    CHOLHDLRATIO 3.4 11/13/2024      She ran out of her Simvastatin at some point and never asked for refills. She does not follow a low fat diet.

## 2025-07-24 NOTE — PROGRESS NOTES
Chief Complaint   Patient presents with    sleeping problems       Vitals:    07/24/25 1509   BP: (!) 136/92   Pulse: (!) 102   Resp: 16   Temp: 98.9 °F (37.2 °C)   SpO2: 98%     Have you been to the ER, urgent care clinic since your last visit?  Hospitalized since your last visit?   YES - When: approximately 2 months ago.  Where and Why: [facility]    Have you seen or consulted any other health care providers outside our system since your last visit?   NO    Have you had a mammogram?”   NO    Date of last Mammogram: 5/21/2024      “Have you had a pap smear?”    NO    No cervical cancer screening on file       “Have you had a colorectal cancer screening such as a colonoscopy/FIT/Cologuard?    NO    No colonoscopy on file  No cologuard on file  No FIT/FOBT on file   No flexible sigmoidoscopy on file     “Have you had a diabetic eye exam?”    NO     No diabetic eye exam on file

## 2025-07-25 VITALS
OXYGEN SATURATION: 98 % | WEIGHT: 210.8 LBS | SYSTOLIC BLOOD PRESSURE: 122 MMHG | BODY MASS INDEX: 39.8 KG/M2 | HEART RATE: 102 BPM | HEIGHT: 61 IN | DIASTOLIC BLOOD PRESSURE: 89 MMHG | RESPIRATION RATE: 16 BRPM | TEMPERATURE: 98.9 F

## 2025-07-28 ENCOUNTER — LAB (OUTPATIENT)
Age: 65
End: 2025-07-28

## 2025-07-28 DIAGNOSIS — F41.1 GAD (GENERALIZED ANXIETY DISORDER): ICD-10-CM

## 2025-07-28 DIAGNOSIS — E78.00 HYPERCHOLESTEREMIA: ICD-10-CM

## 2025-07-28 DIAGNOSIS — E11.9 TYPE 2 DIABETES MELLITUS WITHOUT COMPLICATION, WITHOUT LONG-TERM CURRENT USE OF INSULIN (HCC): ICD-10-CM

## 2025-07-28 PROCEDURE — 36415 COLL VENOUS BLD VENIPUNCTURE: CPT | Performed by: NURSE PRACTITIONER

## 2025-07-29 LAB
ALBUMIN SERPL-MCNC: 3.9 G/DL (ref 3.5–5)
ALBUMIN/GLOB SERPL: 1.1 (ref 1.1–2.2)
ALP SERPL-CCNC: 134 U/L (ref 45–117)
ALT SERPL-CCNC: 23 U/L (ref 12–78)
ANION GAP SERPL CALC-SCNC: 9 MMOL/L (ref 2–12)
AST SERPL-CCNC: 20 U/L (ref 15–37)
BASOPHILS # BLD: 0.02 K/UL (ref 0–0.1)
BASOPHILS NFR BLD: 0.6 % (ref 0–1)
BILIRUB SERPL-MCNC: 0.5 MG/DL (ref 0.2–1)
BUN SERPL-MCNC: 9 MG/DL (ref 6–20)
BUN/CREAT SERPL: 9 (ref 12–20)
CALCIUM SERPL-MCNC: 9.2 MG/DL (ref 8.5–10.1)
CHLORIDE SERPL-SCNC: 107 MMOL/L (ref 97–108)
CHOLEST SERPL-MCNC: 164 MG/DL
CO2 SERPL-SCNC: 23 MMOL/L (ref 21–32)
CREAT SERPL-MCNC: 0.96 MG/DL (ref 0.55–1.02)
DIFFERENTIAL METHOD BLD: ABNORMAL
EOSINOPHIL # BLD: 0.01 K/UL (ref 0–0.4)
EOSINOPHIL NFR BLD: 0.3 % (ref 0–7)
ERYTHROCYTE [DISTWIDTH] IN BLOOD BY AUTOMATED COUNT: 12.5 % (ref 11.5–14.5)
EST. AVERAGE GLUCOSE BLD GHB EST-MCNC: 226 MG/DL
GLOBULIN SER CALC-MCNC: 3.7 G/DL (ref 2–4)
GLUCOSE SERPL-MCNC: 199 MG/DL (ref 65–100)
HBA1C MFR BLD: 9.5 % (ref 4–5.6)
HCT VFR BLD AUTO: 37.2 % (ref 35–47)
HDLC SERPL-MCNC: 71 MG/DL
HDLC SERPL: 2.3 (ref 0–5)
HGB BLD-MCNC: 11.7 G/DL (ref 11.5–16)
IMM GRANULOCYTES # BLD AUTO: 0.01 K/UL (ref 0–0.04)
IMM GRANULOCYTES NFR BLD AUTO: 0.3 % (ref 0–0.5)
LDLC SERPL CALC-MCNC: 76 MG/DL (ref 0–100)
LYMPHOCYTES # BLD: 1.35 K/UL (ref 0.8–3.5)
LYMPHOCYTES NFR BLD: 39.5 % (ref 12–49)
MCH RBC QN AUTO: 29.1 PG (ref 26–34)
MCHC RBC AUTO-ENTMCNC: 31.5 G/DL (ref 30–36.5)
MCV RBC AUTO: 92.5 FL (ref 80–99)
MONOCYTES # BLD: 0.26 K/UL (ref 0–1)
MONOCYTES NFR BLD: 7.6 % (ref 5–13)
NEUTS SEG # BLD: 1.77 K/UL (ref 1.8–8)
NEUTS SEG NFR BLD: 51.7 % (ref 32–75)
NRBC # BLD: 0 K/UL (ref 0–0.01)
NRBC BLD-RTO: 0 PER 100 WBC
PLATELET # BLD AUTO: 168 K/UL (ref 150–400)
PMV BLD AUTO: 12.5 FL (ref 8.9–12.9)
POTASSIUM SERPL-SCNC: 4.3 MMOL/L (ref 3.5–5.1)
PROT SERPL-MCNC: 7.6 G/DL (ref 6.4–8.2)
RBC # BLD AUTO: 4.02 M/UL (ref 3.8–5.2)
SODIUM SERPL-SCNC: 139 MMOL/L (ref 136–145)
TRIGL SERPL-MCNC: 85 MG/DL
TSH SERPL DL<=0.05 MIU/L-ACNC: 1.24 UIU/ML (ref 0.36–3.74)
VLDLC SERPL CALC-MCNC: 17 MG/DL
WBC # BLD AUTO: 3.4 K/UL (ref 3.6–11)

## 2025-07-30 DIAGNOSIS — E11.9 TYPE 2 DIABETES MELLITUS WITHOUT COMPLICATION, WITHOUT LONG-TERM CURRENT USE OF INSULIN (HCC): ICD-10-CM

## 2025-08-20 RX ORDER — TRAZODONE HYDROCHLORIDE 50 MG/1
50 TABLET ORAL NIGHTLY
Qty: 90 TABLET | Refills: 3 | Status: SHIPPED | OUTPATIENT
Start: 2025-08-20

## 2025-08-27 ENCOUNTER — OFFICE VISIT (OUTPATIENT)
Age: 65
End: 2025-08-27

## 2025-08-27 VITALS
DIASTOLIC BLOOD PRESSURE: 80 MMHG | TEMPERATURE: 97 F | WEIGHT: 204 LBS | RESPIRATION RATE: 18 BRPM | SYSTOLIC BLOOD PRESSURE: 136 MMHG | BODY MASS INDEX: 38.51 KG/M2 | OXYGEN SATURATION: 98 % | HEART RATE: 81 BPM | HEIGHT: 61 IN

## 2025-08-27 DIAGNOSIS — E11.65 TYPE 2 DIABETES MELLITUS WITH HYPERGLYCEMIA, WITHOUT LONG-TERM CURRENT USE OF INSULIN (HCC): Primary | ICD-10-CM

## 2025-08-27 LAB — GLUCOSE, POC: 123 MG/DL

## 2025-08-27 PROCEDURE — 99212 OFFICE O/P EST SF 10 MIN: CPT | Performed by: NURSE PRACTITIONER

## 2025-08-27 PROCEDURE — 82962 GLUCOSE BLOOD TEST: CPT | Performed by: NURSE PRACTITIONER

## 2025-08-27 PROCEDURE — 3046F HEMOGLOBIN A1C LEVEL >9.0%: CPT | Performed by: NURSE PRACTITIONER
